# Patient Record
Sex: FEMALE | Race: WHITE | Employment: FULL TIME | URBAN - METROPOLITAN AREA
[De-identification: names, ages, dates, MRNs, and addresses within clinical notes are randomized per-mention and may not be internally consistent; named-entity substitution may affect disease eponyms.]

---

## 2019-03-26 LAB
CHLAMYDIA, EXTERNAL: NEGATIVE
HBSAG, EXTERNAL: NEGATIVE
HIV, EXTERNAL: NON REACTIVE
N. GONORRHEA, EXTERNAL: NEGATIVE
RUBELLA, EXTERNAL: NORMAL
T. PALLIDUM, EXTERNAL: NEGATIVE
TYPE, ABO & RH, EXTERNAL: NORMAL

## 2019-08-12 LAB — ANTIBODY SCREEN, EXTERNAL: NEGATIVE

## 2019-10-07 LAB — GRBS, EXTERNAL: POSITIVE

## 2019-11-07 ENCOUNTER — HOSPITAL ENCOUNTER (INPATIENT)
Age: 29
LOS: 5 days | Discharge: HOME | End: 2019-11-12
Attending: OBSTETRICS & GYNECOLOGY | Admitting: OBSTETRICS & GYNECOLOGY
Payer: COMMERCIAL

## 2019-11-07 DIAGNOSIS — Z3A.40 40 WEEKS GESTATION OF PREGNANCY: Primary | ICD-10-CM

## 2019-11-07 PROBLEM — F41.9 ANXIETY AND DEPRESSION: Chronic | Status: ACTIVE | Noted: 2019-11-07

## 2019-11-07 PROBLEM — F32.A ANXIETY AND DEPRESSION: Chronic | Status: ACTIVE | Noted: 2019-11-07

## 2019-11-07 LAB
ERYTHROCYTE [DISTWIDTH] IN BLOOD BY AUTOMATED COUNT: 14.1 % (ref 11.5–14.5)
HCT VFR BLD AUTO: 38.1 % (ref 35–47)
HGB BLD-MCNC: 12.9 G/DL (ref 11.5–16)
MCH RBC QN AUTO: 29.4 PG (ref 26–34)
MCHC RBC AUTO-ENTMCNC: 33.9 G/DL (ref 30–36.5)
MCV RBC AUTO: 86.8 FL (ref 80–99)
NRBC # BLD: 0 K/UL (ref 0–0.01)
NRBC BLD-RTO: 0 PER 100 WBC
PLATELET # BLD AUTO: 235 K/UL (ref 150–400)
PMV BLD AUTO: 11.1 FL (ref 8.9–12.9)
RBC # BLD AUTO: 4.39 M/UL (ref 3.8–5.2)
WBC # BLD AUTO: 10.9 K/UL (ref 3.6–11)

## 2019-11-07 PROCEDURE — 65270000029 HC RM PRIVATE

## 2019-11-07 PROCEDURE — 3E033VJ INTRODUCTION OF OTHER HORMONE INTO PERIPHERAL VEIN, PERCUTANEOUS APPROACH: ICD-10-PCS | Performed by: INTERNAL MEDICINE

## 2019-11-07 PROCEDURE — 77030028565 HC CATH CERV RIPNG BLN COOK -B

## 2019-11-07 PROCEDURE — 75410000002 HC LABOR FEE PER 1 HR

## 2019-11-07 PROCEDURE — 85027 COMPLETE CBC AUTOMATED: CPT

## 2019-11-07 PROCEDURE — 74011250637 HC RX REV CODE- 250/637: Performed by: MIDWIFE

## 2019-11-07 PROCEDURE — 36415 COLL VENOUS BLD VENIPUNCTURE: CPT

## 2019-11-07 RX ORDER — SODIUM CHLORIDE 0.9 % (FLUSH) 0.9 %
SYRINGE (ML) INJECTION
Status: COMPLETED
Start: 2019-11-07 | End: 2019-11-07

## 2019-11-07 RX ORDER — ZOLPIDEM TARTRATE 5 MG/1
5 TABLET ORAL
Status: DISCONTINUED | OUTPATIENT
Start: 2019-11-07 | End: 2019-11-09 | Stop reason: SDUPTHER

## 2019-11-07 RX ORDER — NALBUPHINE HYDROCHLORIDE 10 MG/ML
10 INJECTION, SOLUTION INTRAMUSCULAR; INTRAVENOUS; SUBCUTANEOUS
Status: DISCONTINUED | OUTPATIENT
Start: 2019-11-07 | End: 2019-11-09 | Stop reason: HOSPADM

## 2019-11-07 RX ORDER — SODIUM CHLORIDE 0.9 % (FLUSH) 0.9 %
5-40 SYRINGE (ML) INJECTION AS NEEDED
Status: DISCONTINUED | OUTPATIENT
Start: 2019-11-07 | End: 2019-11-12 | Stop reason: HOSPADM

## 2019-11-07 RX ORDER — SODIUM CHLORIDE, SODIUM LACTATE, POTASSIUM CHLORIDE, CALCIUM CHLORIDE 600; 310; 30; 20 MG/100ML; MG/100ML; MG/100ML; MG/100ML
125 INJECTION, SOLUTION INTRAVENOUS CONTINUOUS
Status: DISCONTINUED | OUTPATIENT
Start: 2019-11-07 | End: 2019-11-12 | Stop reason: HOSPADM

## 2019-11-07 RX ORDER — SODIUM CHLORIDE 0.9 % (FLUSH) 0.9 %
5-40 SYRINGE (ML) INJECTION EVERY 8 HOURS
Status: DISCONTINUED | OUTPATIENT
Start: 2019-11-07 | End: 2019-11-12 | Stop reason: HOSPADM

## 2019-11-07 RX ORDER — OXYTOCIN/0.9 % SODIUM CHLORIDE 30/500 ML
0-25 PLASTIC BAG, INJECTION (ML) INTRAVENOUS
Status: DISCONTINUED | OUTPATIENT
Start: 2019-11-08 | End: 2019-11-09 | Stop reason: HOSPADM

## 2019-11-07 RX ORDER — ONDANSETRON 2 MG/ML
4 INJECTION INTRAMUSCULAR; INTRAVENOUS
Status: DISCONTINUED | OUTPATIENT
Start: 2019-11-07 | End: 2019-11-09 | Stop reason: HOSPADM

## 2019-11-07 RX ORDER — TERBUTALINE SULFATE 1 MG/ML
0.25 INJECTION SUBCUTANEOUS AS NEEDED
Status: DISCONTINUED | OUTPATIENT
Start: 2019-11-07 | End: 2019-11-09 | Stop reason: HOSPADM

## 2019-11-07 RX ORDER — CALCIUM CARBONATE 200(500)MG
1 TABLET,CHEWABLE ORAL DAILY
COMMUNITY

## 2019-11-07 RX ADMIN — Medication 10 ML: at 20:16

## 2019-11-07 RX ADMIN — Medication 10 ML: at 16:36

## 2019-11-07 RX ADMIN — ZOLPIDEM TARTRATE 5 MG: 5 TABLET ORAL at 20:15

## 2019-11-07 NOTE — PROGRESS NOTES
1A  admitted to LR 3 under the services of Dr. Eddie Thomason, Dr. Isaac Winston covering. Pt admitted for elective induction. Pt denies complications with this pregnancy. 163:  IV started in R hand and saline locked, see MAR. Labs drawn with IV start. :  Dr. Isaac Winston at bedside discussing plan of care with pt.  7430:  Cervical exam per Dr. Isaac Winston:  3.  :  Cervical ripening balloon placed per Dr. Isaac Winston:  50 ml NaCl in uterine balloon and 60 ml NaCl in vaginal balloon. :  Fetal monitors off.  :  Bedside and Verbal shift change report given to JUAN Kaminski RN (oncoming nurse) by HAN Boss RN (offgoing nurse). Report included the following information SBAR, Kardex, Intake/Output, MAR and Recent Results.

## 2019-11-08 ENCOUNTER — ANESTHESIA (OUTPATIENT)
Dept: LABOR AND DELIVERY | Age: 29
End: 2019-11-08
Payer: COMMERCIAL

## 2019-11-08 ENCOUNTER — ANESTHESIA EVENT (OUTPATIENT)
Dept: LABOR AND DELIVERY | Age: 29
End: 2019-11-08
Payer: COMMERCIAL

## 2019-11-08 PROCEDURE — 75410000002 HC LABOR FEE PER 1 HR

## 2019-11-08 PROCEDURE — 74011000250 HC RX REV CODE- 250: Performed by: ANESTHESIOLOGY

## 2019-11-08 PROCEDURE — 74011250636 HC RX REV CODE- 250/636: Performed by: MIDWIFE

## 2019-11-08 PROCEDURE — 74011250636 HC RX REV CODE- 250/636: Performed by: OBSTETRICS & GYNECOLOGY

## 2019-11-08 PROCEDURE — 74011000250 HC RX REV CODE- 250

## 2019-11-08 PROCEDURE — 65270000029 HC RM PRIVATE

## 2019-11-08 PROCEDURE — 74011000258 HC RX REV CODE- 258: Performed by: OBSTETRICS & GYNECOLOGY

## 2019-11-08 PROCEDURE — 77030011943

## 2019-11-08 RX ORDER — BUPIVACAINE HYDROCHLORIDE 2.5 MG/ML
10 INJECTION, SOLUTION EPIDURAL; INFILTRATION; INTRACAUDAL ONCE
Status: DISCONTINUED | OUTPATIENT
Start: 2019-11-08 | End: 2019-11-09 | Stop reason: HOSPADM

## 2019-11-08 RX ORDER — NALOXONE HYDROCHLORIDE 0.4 MG/ML
0.4 INJECTION, SOLUTION INTRAMUSCULAR; INTRAVENOUS; SUBCUTANEOUS AS NEEDED
Status: DISCONTINUED | OUTPATIENT
Start: 2019-11-08 | End: 2019-11-09 | Stop reason: HOSPADM

## 2019-11-08 RX ORDER — BUPIVACAINE HYDROCHLORIDE 2.5 MG/ML
INJECTION, SOLUTION EPIDURAL; INFILTRATION; INTRACAUDAL AS NEEDED
Status: DISCONTINUED | OUTPATIENT
Start: 2019-11-08 | End: 2019-11-09 | Stop reason: HOSPADM

## 2019-11-08 RX ORDER — NALBUPHINE HYDROCHLORIDE 10 MG/ML
2.5 INJECTION, SOLUTION INTRAMUSCULAR; INTRAVENOUS; SUBCUTANEOUS
Status: DISCONTINUED | OUTPATIENT
Start: 2019-11-08 | End: 2019-11-09 | Stop reason: HOSPADM

## 2019-11-08 RX ORDER — FENTANYL/BUPIVACAINE/NS/PF 2-1250MCG
1-16 PREFILLED PUMP RESERVOIR EPIDURAL CONTINUOUS
Status: DISCONTINUED | OUTPATIENT
Start: 2019-11-08 | End: 2019-11-09 | Stop reason: HOSPADM

## 2019-11-08 RX ORDER — EPHEDRINE SULFATE/0.9% NACL/PF 50 MG/5 ML
SYRINGE (ML) INTRAVENOUS
Status: DISPENSED
Start: 2019-11-08 | End: 2019-11-09

## 2019-11-08 RX ORDER — EPHEDRINE SULFATE/0.9% NACL/PF 50 MG/5 ML
10 SYRINGE (ML) INTRAVENOUS
Status: DISCONTINUED | OUTPATIENT
Start: 2019-11-08 | End: 2019-11-09 | Stop reason: HOSPADM

## 2019-11-08 RX ORDER — FENTANYL/BUPIVACAINE/NS/PF 2-1250MCG
PREFILLED PUMP RESERVOIR EPIDURAL
Status: COMPLETED
Start: 2019-11-08 | End: 2019-11-08

## 2019-11-08 RX ORDER — FENTANYL CITRATE 50 UG/ML
100 INJECTION, SOLUTION INTRAMUSCULAR; INTRAVENOUS ONCE
Status: DISCONTINUED | OUTPATIENT
Start: 2019-11-08 | End: 2019-11-09 | Stop reason: HOSPADM

## 2019-11-08 RX ORDER — FENTANYL CITRATE 50 UG/ML
INJECTION, SOLUTION INTRAMUSCULAR; INTRAVENOUS AS NEEDED
Status: DISCONTINUED | OUTPATIENT
Start: 2019-11-08 | End: 2019-11-09 | Stop reason: HOSPADM

## 2019-11-08 RX ORDER — FENTANYL CITRATE 50 UG/ML
INJECTION, SOLUTION INTRAMUSCULAR; INTRAVENOUS
Status: COMPLETED
Start: 2019-11-08 | End: 2019-11-08

## 2019-11-08 RX ORDER — BUPIVACAINE HYDROCHLORIDE 2.5 MG/ML
INJECTION, SOLUTION EPIDURAL; INFILTRATION; INTRACAUDAL
Status: COMPLETED
Start: 2019-11-08 | End: 2019-11-08

## 2019-11-08 RX ADMIN — PENICILLIN G POTASSIUM 2.5 MILLION UNITS: 20000000 POWDER, FOR SOLUTION INTRAVENOUS at 16:00

## 2019-11-08 RX ADMIN — OXYTOCIN-SODIUM CHLORIDE 0.9% IV SOLN 30 UNIT/500ML 2 MILLI-UNITS/MIN: 30-0.9/5 SOLUTION at 04:03

## 2019-11-08 RX ADMIN — Medication 10 ML/HR: at 22:17

## 2019-11-08 RX ADMIN — SODIUM CHLORIDE 5 MILLION UNITS: 900 INJECTION, SOLUTION INTRAVENOUS at 04:04

## 2019-11-08 RX ADMIN — PENICILLIN G POTASSIUM 2.5 MILLION UNITS: 20000000 POWDER, FOR SOLUTION INTRAVENOUS at 20:11

## 2019-11-08 RX ADMIN — Medication 10 ML: at 04:00

## 2019-11-08 RX ADMIN — PENICILLIN G POTASSIUM 2.5 MILLION UNITS: 20000000 POWDER, FOR SOLUTION INTRAVENOUS at 08:23

## 2019-11-08 RX ADMIN — PENICILLIN G POTASSIUM 2.5 MILLION UNITS: 20000000 POWDER, FOR SOLUTION INTRAVENOUS at 12:00

## 2019-11-08 RX ADMIN — SODIUM CHLORIDE, SODIUM LACTATE, POTASSIUM CHLORIDE, AND CALCIUM CHLORIDE 125 ML/HR: 600; 310; 30; 20 INJECTION, SOLUTION INTRAVENOUS at 22:23

## 2019-11-08 RX ADMIN — ONDANSETRON 4 MG: 2 INJECTION INTRAMUSCULAR; INTRAVENOUS at 17:10

## 2019-11-08 RX ADMIN — NALBUPHINE HYDROCHLORIDE 10 MG: 10 INJECTION, SOLUTION INTRAMUSCULAR; INTRAVENOUS; SUBCUTANEOUS at 14:12

## 2019-11-08 RX ADMIN — BUPIVACAINE HYDROCHLORIDE 10 ML: 2.5 INJECTION, SOLUTION EPIDURAL; INFILTRATION; INTRACAUDAL; PERINEURAL at 21:55

## 2019-11-08 RX ADMIN — SODIUM CHLORIDE, SODIUM LACTATE, POTASSIUM CHLORIDE, AND CALCIUM CHLORIDE 125 ML/HR: 600; 310; 30; 20 INJECTION, SOLUTION INTRAVENOUS at 13:11

## 2019-11-08 RX ADMIN — SODIUM CHLORIDE, SODIUM LACTATE, POTASSIUM CHLORIDE, AND CALCIUM CHLORIDE 125 ML/HR: 600; 310; 30; 20 INJECTION, SOLUTION INTRAVENOUS at 04:03

## 2019-11-08 RX ADMIN — FENTANYL CITRATE 100 MCG: 50 INJECTION, SOLUTION INTRAMUSCULAR; INTRAVENOUS at 21:55

## 2019-11-08 NOTE — PROGRESS NOTES
1930 Bedside SBAR report received from [address] reactive tracing- monitor off- plan of care discussed- ambien given- pt instructed to call for ROM, strong regular contractions.  Will begin pitocin at 0400 2330 Bedside SBAR report to [address]

## 2019-11-08 NOTE — PROGRESS NOTES
2330: Bedside and Verbal shift change report given to TRISTIN Koo (oncoming nurse) by Shelly Gautam RN (offgoing nurse). Report included the following information SBAR, Intake/Output, MAR and Recent Results. 0500: Amo balloon removed without difficulty by RN. \    [id number]: Bedside and Verbal shift change report given to VEE Nevarez (oncoming nurse) by [employer]. Ronald Squires RN (offgoing nurse). Report included the following information SBAR, Intake/Output, MAR and Recent Results.

## 2019-11-08 NOTE — PROGRESS NOTES
Bedside shift change report given to VEE Lopez RN (oncoming nurse) by [employer]. Sohan Contreras RN (offgoing nurse). Report included the following information SBAR, Kardex and MAR.     1414 nubain given for pain management. 1635 side lying pelvic release performed on both left and right sides.

## 2019-11-08 NOTE — PROGRESS NOTES
In room to see patient. Feeling some CTX. Visit Vitals  /73   Pulse 66   Temp 98.6 °F (37 °C)   Resp 14   Ht 5' 2\" (1.575 m)   Wt 90.3 kg (199 lb)   Breastfeeding? No   BMI 36.40 kg/m²     SVE: Pt unable to tolerate  EFM: Cat 1  [place]: Q2-3 mins    A/P: Pt is a 35 yo G1 at 40+4 presenting for postdates IOL. Continue pitocin augmentation. Reattempt ROM at 11am.  Epidural PRN. Anticipate .

## 2019-11-08 NOTE — PROGRESS NOTES
In room to see patient. Feeling CTX slightly more strongly. Visit Vitals  /74   Pulse 84   Temp 98.6 °F (37 °C)   Resp 14   Ht 5' 2\" (1.575 m)   Wt 90.3 kg (199 lb)   Breastfeeding? No   BMI 36.40 kg/m²     SVE: /-3, AROM clear fluid  EFM: Cat 1  [place]: CTX q2-3 mins    A/P: Pt is a 33 yo G1 at 40+4 wga presenting for postdates IOL. Continue pitocin augmentation. NO2/Epidural PRN. Anticipate .

## 2019-11-08 NOTE — PROGRESS NOTES
CNM Labor Progress Note     Patient: Isaak Flanagan MRN: [id number]  SSN: [id number]    YOB: 1990  Age: 34 y.o. Sex: female        Subjective:   Patient coping well with intracervical catheter; She is sitting in bed with spouse in room; Discussed POC to have ambien for sleep and start pitocin/PCN @ 0400; No questions at this time. Objective:   Blood pressure 127/82, pulse 82, temperature 99.3 °F (37.4 °C), resp. rate 18, height 5' 2\" (1.575 m), weight 90.3 kg (199 lb), not currently breastfeeding. Patient Vitals for the past 4 hrs:    Mode Fetal Heart Rate Fetal Activity Variability Decelerations Accelerations RN Reviewed Strip?   19 1859 External 135 Present 6-25 BPM None Yes Yes   19 1830 External 135 Present 6-25 BPM None Yes Yes   19 1800 External 145 Present 6-25 BPM None Yes Yes   19 1730 External 140 Present 6-25 BPM None Yes Yes   19 1700 External 145 Present 6-25 BPM None Yes Yes   19 1630 External 135 Present 6-25 BPM None No Yes   19 1617 External 140          Sterile Vaginal Exam: Deferred    Assessment:    SIUP @ 40w3d by Estimated Date of Delivery: 19  Labor induction, post dates    Plan:   Continue current orders/management    -ambien for sleep   -start pitocin and PCN (GBS prophylaxis) at 0400   -remove intracervical balloon post 12 hours after placement  Pain management, as needed  CNM management   Anticipate     Collaborative MD: Michel    Signed By:  Maria Alejandra Reed CNM      2019 7:56 PM

## 2019-11-09 PROCEDURE — 75410000002 HC LABOR FEE PER 1 HR

## 2019-11-09 PROCEDURE — 74011250636 HC RX REV CODE- 250/636: Performed by: MIDWIFE

## 2019-11-09 PROCEDURE — 74011250636 HC RX REV CODE- 250/636: Performed by: ANESTHESIOLOGY

## 2019-11-09 PROCEDURE — 77030012890

## 2019-11-09 PROCEDURE — 76060000078 HC EPIDURAL ANESTHESIA: Performed by: OBSTETRICS & GYNECOLOGY

## 2019-11-09 PROCEDURE — 76010000391 HC C SECN FIRST 1 HR: Performed by: OBSTETRICS & GYNECOLOGY

## 2019-11-09 PROCEDURE — 74011250636 HC RX REV CODE- 250/636: Performed by: OBSTETRICS & GYNECOLOGY

## 2019-11-09 PROCEDURE — 74011000250 HC RX REV CODE- 250: Performed by: ANESTHESIOLOGY

## 2019-11-09 PROCEDURE — 76010000392 HC C SECN EA ADDL 0.5 HR: Performed by: OBSTETRICS & GYNECOLOGY

## 2019-11-09 PROCEDURE — 10907ZC DRAINAGE OF AMNIOTIC FLUID, THERAPEUTIC FROM PRODUCTS OF CONCEPTION, VIA NATURAL OR ARTIFICIAL OPENING: ICD-10-PCS | Performed by: OBSTETRICS & GYNECOLOGY

## 2019-11-09 PROCEDURE — 77030040830 HC CATH URETH FOL MDII -A

## 2019-11-09 PROCEDURE — 65410000002 HC RM PRIVATE OB

## 2019-11-09 PROCEDURE — 75410000003 HC RECOV DEL/VAG/CSECN EA 0.5 HR: Performed by: OBSTETRICS & GYNECOLOGY

## 2019-11-09 PROCEDURE — 74011250636 HC RX REV CODE- 250/636: Performed by: NURSE ANESTHETIST, CERTIFIED REGISTERED

## 2019-11-09 PROCEDURE — 74011250636 HC RX REV CODE- 250/636

## 2019-11-09 RX ORDER — MORPHINE SULFATE 10 MG/ML
6 INJECTION, SOLUTION INTRAMUSCULAR; INTRAVENOUS
Status: DISCONTINUED | OUTPATIENT
Start: 2019-11-09 | End: 2019-11-12 | Stop reason: HOSPADM

## 2019-11-09 RX ORDER — SIMETHICONE 80 MG
80 TABLET,CHEWABLE ORAL
Status: DISCONTINUED | OUTPATIENT
Start: 2019-11-09 | End: 2019-11-12 | Stop reason: HOSPADM

## 2019-11-09 RX ORDER — SODIUM CHLORIDE 0.9 % (FLUSH) 0.9 %
5-40 SYRINGE (ML) INJECTION AS NEEDED
Status: DISCONTINUED | OUTPATIENT
Start: 2019-11-09 | End: 2019-11-12 | Stop reason: HOSPADM

## 2019-11-09 RX ORDER — NALOXONE HYDROCHLORIDE 0.4 MG/ML
0.4 INJECTION, SOLUTION INTRAMUSCULAR; INTRAVENOUS; SUBCUTANEOUS AS NEEDED
Status: DISCONTINUED | OUTPATIENT
Start: 2019-11-09 | End: 2019-11-12 | Stop reason: HOSPADM

## 2019-11-09 RX ORDER — LIDOCAINE HYDROCHLORIDE AND EPINEPHRINE 20; 5 MG/ML; UG/ML
INJECTION, SOLUTION EPIDURAL; INFILTRATION; INTRACAUDAL; PERINEURAL
Status: COMPLETED
Start: 2019-11-09 | End: 2019-11-09

## 2019-11-09 RX ORDER — IBUPROFEN 400 MG/1
800 TABLET ORAL EVERY 8 HOURS
Status: DISCONTINUED | OUTPATIENT
Start: 2019-11-09 | End: 2019-11-12 | Stop reason: HOSPADM

## 2019-11-09 RX ORDER — SODIUM CHLORIDE, SODIUM LACTATE, POTASSIUM CHLORIDE, CALCIUM CHLORIDE 600; 310; 30; 20 MG/100ML; MG/100ML; MG/100ML; MG/100ML
125 INJECTION, SOLUTION INTRAVENOUS CONTINUOUS
Status: DISCONTINUED | OUTPATIENT
Start: 2019-11-09 | End: 2019-11-12 | Stop reason: HOSPADM

## 2019-11-09 RX ORDER — LIDOCAINE HYDROCHLORIDE AND EPINEPHRINE 20; 5 MG/ML; UG/ML
INJECTION, SOLUTION EPIDURAL; INFILTRATION; INTRACAUDAL; PERINEURAL AS NEEDED
Status: DISCONTINUED | OUTPATIENT
Start: 2019-11-09 | End: 2019-11-09 | Stop reason: HOSPADM

## 2019-11-09 RX ORDER — OXYCODONE AND ACETAMINOPHEN 5; 325 MG/1; MG/1
2 TABLET ORAL
Status: DISCONTINUED | OUTPATIENT
Start: 2019-11-09 | End: 2019-11-12 | Stop reason: HOSPADM

## 2019-11-09 RX ORDER — KETOROLAC TROMETHAMINE 30 MG/ML
30 INJECTION, SOLUTION INTRAMUSCULAR; INTRAVENOUS
Status: DISPENSED | OUTPATIENT
Start: 2019-11-09 | End: 2019-11-10

## 2019-11-09 RX ORDER — SODIUM CHLORIDE 0.9 % (FLUSH) 0.9 %
5-40 SYRINGE (ML) INJECTION EVERY 8 HOURS
Status: DISCONTINUED | OUTPATIENT
Start: 2019-11-09 | End: 2019-11-12 | Stop reason: HOSPADM

## 2019-11-09 RX ORDER — OXYTOCIN 10 [USP'U]/ML
INJECTION, SOLUTION INTRAMUSCULAR; INTRAVENOUS
Status: COMPLETED
Start: 2019-11-09 | End: 2019-11-09

## 2019-11-09 RX ORDER — ACETAMINOPHEN 325 MG/1
650 TABLET ORAL
Status: DISCONTINUED | OUTPATIENT
Start: 2019-11-09 | End: 2019-11-12 | Stop reason: HOSPADM

## 2019-11-09 RX ORDER — OXYTOCIN/RINGER'S LACTATE 20/1000 ML
125 PLASTIC BAG, INJECTION (ML) INTRAVENOUS AS NEEDED
Status: DISCONTINUED | OUTPATIENT
Start: 2019-11-09 | End: 2019-11-12 | Stop reason: HOSPADM

## 2019-11-09 RX ORDER — SODIUM CHLORIDE, SODIUM LACTATE, POTASSIUM CHLORIDE, CALCIUM CHLORIDE 600; 310; 30; 20 MG/100ML; MG/100ML; MG/100ML; MG/100ML
INJECTION, SOLUTION INTRAVENOUS
Status: DISCONTINUED | OUTPATIENT
Start: 2019-11-09 | End: 2019-11-09 | Stop reason: HOSPADM

## 2019-11-09 RX ORDER — MORPHINE SULFATE 0.5 MG/ML
INJECTION, SOLUTION EPIDURAL; INTRATHECAL; INTRAVENOUS AS NEEDED
Status: DISCONTINUED | OUTPATIENT
Start: 2019-11-09 | End: 2019-11-09 | Stop reason: HOSPADM

## 2019-11-09 RX ORDER — ONDANSETRON 2 MG/ML
4 INJECTION INTRAMUSCULAR; INTRAVENOUS
Status: DISCONTINUED | OUTPATIENT
Start: 2019-11-09 | End: 2019-11-12 | Stop reason: HOSPADM

## 2019-11-09 RX ORDER — AMMONIA 15 % (W/V)
1 AMPUL (EA) INHALATION AS NEEDED
Status: DISCONTINUED | OUTPATIENT
Start: 2019-11-09 | End: 2019-11-12 | Stop reason: HOSPADM

## 2019-11-09 RX ORDER — HYDROCORTISONE 1 %
CREAM (GRAM) TOPICAL AS NEEDED
Status: DISCONTINUED | OUTPATIENT
Start: 2019-11-09 | End: 2019-11-12 | Stop reason: HOSPADM

## 2019-11-09 RX ORDER — DIPHENHYDRAMINE HCL 25 MG
50 CAPSULE ORAL
Status: DISCONTINUED | OUTPATIENT
Start: 2019-11-09 | End: 2019-11-12 | Stop reason: HOSPADM

## 2019-11-09 RX ORDER — DEXAMETHASONE SODIUM PHOSPHATE 4 MG/ML
INJECTION, SOLUTION INTRA-ARTICULAR; INTRALESIONAL; INTRAMUSCULAR; INTRAVENOUS; SOFT TISSUE AS NEEDED
Status: DISCONTINUED | OUTPATIENT
Start: 2019-11-09 | End: 2019-11-09

## 2019-11-09 RX ORDER — ONDANSETRON 2 MG/ML
INJECTION INTRAMUSCULAR; INTRAVENOUS AS NEEDED
Status: DISCONTINUED | OUTPATIENT
Start: 2019-11-09 | End: 2019-11-09

## 2019-11-09 RX ORDER — NALBUPHINE HYDROCHLORIDE 10 MG/ML
2.5 INJECTION, SOLUTION INTRAMUSCULAR; INTRAVENOUS; SUBCUTANEOUS
Status: DISCONTINUED | OUTPATIENT
Start: 2019-11-09 | End: 2019-11-12 | Stop reason: HOSPADM

## 2019-11-09 RX ORDER — PHENYLEPHRINE HCL IN 0.9% NACL 0.4MG/10ML
SYRINGE (ML) INTRAVENOUS AS NEEDED
Status: DISCONTINUED | OUTPATIENT
Start: 2019-11-09 | End: 2019-11-09 | Stop reason: HOSPADM

## 2019-11-09 RX ORDER — OXYTOCIN 10 [USP'U]/ML
10 INJECTION, SOLUTION INTRAMUSCULAR; INTRAVENOUS ONCE
Status: COMPLETED | OUTPATIENT
Start: 2019-11-09 | End: 2019-11-09

## 2019-11-09 RX ORDER — OXYTOCIN/RINGER'S LACTATE 20/1000 ML
999 PLASTIC BAG, INJECTION (ML) INTRAVENOUS AS NEEDED
Status: DISCONTINUED | OUTPATIENT
Start: 2019-11-09 | End: 2019-11-12 | Stop reason: HOSPADM

## 2019-11-09 RX ORDER — ONDANSETRON 4 MG/1
4 TABLET, ORALLY DISINTEGRATING ORAL
Status: DISCONTINUED | OUTPATIENT
Start: 2019-11-09 | End: 2019-11-12 | Stop reason: HOSPADM

## 2019-11-09 RX ORDER — CEFAZOLIN SODIUM/WATER 2 G/20 ML
2 SYRINGE (ML) INTRAVENOUS ONCE
Status: COMPLETED | OUTPATIENT
Start: 2019-11-09 | End: 2019-11-09

## 2019-11-09 RX ORDER — OXYCODONE AND ACETAMINOPHEN 5; 325 MG/1; MG/1
1 TABLET ORAL
Status: DISCONTINUED | OUTPATIENT
Start: 2019-11-09 | End: 2019-11-12 | Stop reason: HOSPADM

## 2019-11-09 RX ORDER — ZOLPIDEM TARTRATE 5 MG/1
5 TABLET ORAL
Status: DISCONTINUED | OUTPATIENT
Start: 2019-11-09 | End: 2019-11-12 | Stop reason: HOSPADM

## 2019-11-09 RX ORDER — DOCUSATE SODIUM 100 MG/1
100 CAPSULE, LIQUID FILLED ORAL
Status: DISCONTINUED | OUTPATIENT
Start: 2019-11-09 | End: 2019-11-12 | Stop reason: HOSPADM

## 2019-11-09 RX ORDER — MORPHINE SULFATE 10 MG/ML
10 INJECTION, SOLUTION INTRAMUSCULAR; INTRAVENOUS
Status: DISCONTINUED | OUTPATIENT
Start: 2019-11-09 | End: 2019-11-12 | Stop reason: HOSPADM

## 2019-11-09 RX ORDER — OXYTOCIN/RINGER'S LACTATE 20/1000 ML
PLASTIC BAG, INJECTION (ML) INTRAVENOUS
Status: COMPLETED
Start: 2019-11-09 | End: 2019-11-09

## 2019-11-09 RX ORDER — OXYTOCIN/RINGER'S LACTATE 20/1000 ML
PLASTIC BAG, INJECTION (ML) INTRAVENOUS AS NEEDED
Status: DISCONTINUED | OUTPATIENT
Start: 2019-11-09 | End: 2019-11-09 | Stop reason: HOSPADM

## 2019-11-09 RX ADMIN — TERBUTALINE SULFATE 0.25 MG: 1 INJECTION SUBCUTANEOUS at 03:34

## 2019-11-09 RX ADMIN — Medication 100 MCG: at 04:23

## 2019-11-09 RX ADMIN — LIDOCAINE HYDROCHLORIDE AND EPINEPHRINE 5 ML: 20; 5 INJECTION, SOLUTION EPIDURAL; INFILTRATION; INTRACAUDAL; PERINEURAL at 04:16

## 2019-11-09 RX ADMIN — MEPERIDINE HYDROCHLORIDE 25 MG: 50 INJECTION INTRAMUSCULAR; INTRAVENOUS; SUBCUTANEOUS at 04:26

## 2019-11-09 RX ADMIN — OXYTOCIN 10 UNITS: 10 INJECTION, SOLUTION INTRAMUSCULAR; INTRAVENOUS at 05:52

## 2019-11-09 RX ADMIN — SODIUM CHLORIDE, POTASSIUM CHLORIDE, SODIUM LACTATE AND CALCIUM CHLORIDE: 600; 310; 30; 20 INJECTION, SOLUTION INTRAVENOUS at 03:52

## 2019-11-09 RX ADMIN — DEXAMETHASONE SODIUM PHOSPHATE 4 MG: 4 INJECTION, SOLUTION INTRAMUSCULAR; INTRAVENOUS at 04:31

## 2019-11-09 RX ADMIN — SODIUM CHLORIDE, SODIUM LACTATE, POTASSIUM CHLORIDE, AND CALCIUM CHLORIDE 125 ML/HR: 600; 310; 30; 20 INJECTION, SOLUTION INTRAVENOUS at 04:08

## 2019-11-09 RX ADMIN — KETOROLAC TROMETHAMINE 30 MG: 30 INJECTION, SOLUTION INTRAMUSCULAR at 13:03

## 2019-11-09 RX ADMIN — KETOROLAC TROMETHAMINE 30 MG: 30 INJECTION, SOLUTION INTRAMUSCULAR at 19:59

## 2019-11-09 RX ADMIN — FENTANYL CITRATE 100 MCG: 50 INJECTION, SOLUTION INTRAMUSCULAR; INTRAVENOUS at 04:26

## 2019-11-09 RX ADMIN — Medication 200 MCG: at 04:28

## 2019-11-09 RX ADMIN — AZITHROMYCIN MONOHYDRATE 500 MG: 500 INJECTION, POWDER, LYOPHILIZED, FOR SOLUTION INTRAVENOUS at 04:21

## 2019-11-09 RX ADMIN — PENICILLIN G POTASSIUM 2.5 MILLION UNITS: 20000000 POWDER, FOR SOLUTION INTRAVENOUS at 02:25

## 2019-11-09 RX ADMIN — Medication 100 MCG: at 04:14

## 2019-11-09 RX ADMIN — OXYTOCIN-SODIUM CHLORIDE 0.9% IV SOLN 30 UNIT/500ML 15 MILLI-UNITS/MIN: 30-0.9/5 SOLUTION at 03:09

## 2019-11-09 RX ADMIN — Medication 2 G: at 04:00

## 2019-11-09 RX ADMIN — Medication 100 MCG: at 04:34

## 2019-11-09 RX ADMIN — Medication 100 MCG: at 04:50

## 2019-11-09 RX ADMIN — MORPHINE SULFATE 3 MG: 0.5 INJECTION, SOLUTION EPIDURAL; INTRATHECAL; INTRAVENOUS at 04:54

## 2019-11-09 RX ADMIN — LIDOCAINE HYDROCHLORIDE AND EPINEPHRINE 5 ML: 20; 5 INJECTION, SOLUTION EPIDURAL; INFILTRATION; INTRACAUDAL; PERINEURAL at 04:10

## 2019-11-09 RX ADMIN — Medication 100 MCG: at 04:46

## 2019-11-09 RX ADMIN — Medication 100 MCG: at 04:40

## 2019-11-09 RX ADMIN — ONDANSETRON HYDROCHLORIDE 4 MG: 2 INJECTION, SOLUTION INTRAMUSCULAR; INTRAVENOUS at 04:29

## 2019-11-09 RX ADMIN — Medication 1000 ML: at 04:39

## 2019-11-09 NOTE — PROGRESS NOTES
CNM Labor Progress Note     Patient: Miracle Alcantara MRN: [id number]  SSN: [id number]    YOB: 1990  Age: 34 y.o. Sex: female        Subjective:   Patient would like an epidural. Reports increased pain and being tired. Requests cervical exam.      Objective:   Blood pressure 122/70, pulse 79, temperature 97.5 °F (36.4 °C), resp. rate 18, height 5' 2\" (1.575 m), weight 90.3 kg (199 lb), not currently breastfeeding. Fetal heart baseline 120, moderate variability, positive accelerations,  negative decelerations, Uterine contractions q 2-5 minutes, moderate to palpation, resting tone soft, Sterile Vaginal Exam: 5 cm dilated/ 70 % effaced/ -2 station, cervix  midline, fetal presentation vertex, membranes ruptured for continued clear fluid. Pitocin stopped    IUPC placed without difficulty, patient and fetus tolerated well. Patient Vitals for the past 4 hrs:   Temp Pulse Resp BP   19 1941 97.5 °F (36.4 °C) 79 18 122/70       Assessment:     40w4d  Category 1 fetal heart rate tracing   IOL for postdates- slow progress    Plan:   IUPC placed to confirm adequacy. Pitocin break for 2 hours, then re titrate to adequate pattern. Epidural for pain control. Recheck cervix 4 hours after adequate pattern achieved.   Anticipate DELFINA Crespo

## 2019-11-09 NOTE — ANESTHESIA PROCEDURE NOTES
Epidural Block    Start time: 11/8/2019 9:45 PM  End time: 11/8/2019 9:45 PM  Performed by: Zoila Perry MD  Authorized by: Zoila Perry MD     Pre-Procedure  Indication: labor epidural    Preanesthetic Checklist: patient identified, risks and benefits discussed, anesthesia consent, site marked, patient being monitored, timeout performed and anesthesia consent    Timeout Time: 21:45        Epidural:   Patient position:  Seated  Prep region:  Lumbar  Prep: Patient draped and Chlorhexidine    Location:  L3-4    Needle and Epidural Catheter:   Needle Type:  Tuohy  Needle Gauge:  17 G  Injection Technique:  Loss of resistance using saline  Attempts:  1  Catheter Size:  18 G  Catheter at Skin Depth (cm):  13  Depth in Epidural Space (cm):  8  Events: no blood with aspiration, no cerebrospinal fluid with aspiration, no paresthesia and negative aspiration test    Test Dose:  Negative    Assessment:   Catheter Secured:  Tegaderm and tape  Insertion:  Uncomplicated  Patient tolerance:  Patient tolerated the procedure well with no immediate complications

## 2019-11-09 NOTE — PROGRESS NOTES
1935: Bedside shift change report given to DANNIE Amos RN (oncoming nurse) by VEE Ford, DOT (offgoing nurse). Report included the following information SBAR, Intake/Output, MAR and Recent Results. 2002: VEE Meyers at bedside discussing plan of care. 2106: VEE Khalil at bedside. SVE unchanged. 2109: IUPC placed     2112: Pt requesting epidural. Fluid bolus started    2201: Dr. Anna Carson at bedside    9087: C/S called by Dr. Dominick Choudhury: Bolus started    0630: transfer of care and report given to TRISTIN Wilkerson RN

## 2019-11-09 NOTE — LACTATION NOTE
This note was copied from a baby's chart. Initial Lactation Consultation - Baby born by  this morning to a  mom at 36 5/7 weeks gestation. Mom said the baby has been sleepy and not interested in breast feeding. She has been able to hand express and give drops of colostrum. I helped mom with a feeding this afternoon. Baby is spitting a small amount of clear fluid. I was able to get him to burp several times. He would not suck on my finger and when we put him to the breast he did not suck at the breast. I helped mom with hand expression. We were able to express 10 drops of colostrum from each breast to give to the baby. Feeding Plan: Mother will keep baby skin to skin as often as possible, feed on demand, respond to feeding cues, obtain latch, listen for audible swallowing, be aware of signs of oxytocin release/ cramping, thirst and sleepiness while breastfeeding. Mom will not limit the time the baby is at the breast. She will allow him to completely finish one breast and then offer the second breast at each feeding. She will call out as needed for assistance with latching. 1500 - Baby continues to be sleepy. I was able to get him to burp several time. He is not rooting or otherwise acting hungry. He will not suck on my finger. I put baby on mom in the prone position. I could get moms nipple into baby's mouth but he would not suck. Mom was able to hand express 20 drops of colostrum that I spoon fed to the baby. 1700 - Deep suctioned for a mod amount of clear fluid. Baby then showing some feeding cues. Back with mom we got baby to latch. He sucked a few times but then fell asleep on the breast. We attempted to get her latched on both breasts. Mom will continue to offer the breast every 2-3 hours. She will hand express and give drops of colostrum if baby doesn't latch.

## 2019-11-09 NOTE — ANESTHESIA POSTPROCEDURE EVALUATION
Post-Anesthesia Evaluation and Assessment    Patient: Abhijeet Leon MRN: [id number]  SSN: [id number]    YOB: 1990  Age: 34 y.o. Sex: female      I have evaluated the patient and they are stable and ready for discharge from the PACU. Cardiovascular Function/Vital Signs  Visit Vitals  BP 99/66   Pulse 99   Temp 36.8 °C (98.3 °F)   Resp 14   Ht 5' 2\" (1.575 m)   Wt 90.3 kg (199 lb)   SpO2 99%   Breastfeeding? No   BMI 36.40 kg/m²       Patient is status post Epidural anesthesia for Procedure(s):   SECTION. Nausea/Vomiting: None    Postoperative hydration reviewed and adequate. Pain:  Pain Scale 1: Labor Algorithm/Pain Intensity (19 0129)   Managed    Neurological Status:   Neuro (WDL): Within Defined Limits (19 0406)   At baseline    Mental Status, Level of Consciousness: Alert and  oriented to person, place, and time    Pulmonary Status:   O2 Device: Room air (19 0800)   Adequate oxygenation and airway patent    Complications related to anesthesia: None    Post-anesthesia assessment completed.  No concerns    Signed By: Saeed Villalta MD     2019

## 2019-11-09 NOTE — PROGRESS NOTES
OB Hospitalist    Called to the bedside to review strip and labor progress. Late decels noted now resolved after intrauterine resuscitation measures- Pitocin turned off, left lateral positioning, oxygen inhahalation. Cervix unchanged after adequate contractions.  section offered.  R/B/A discussed bleeding, infection, risk of injury to bladder bowel, extension of incision, injury to , DVT, PE.    B positive  Iv Ancef and Zithromax    Michelle Haas MD

## 2019-11-09 NOTE — ROUTINE PROCESS
TRANSFER - IN REPORT: 
 
Verbal report received from Bentley Jhaveri RN(name) on Kerline Poot  being received from L & D(unit) for routine progression of care Report consisted of patients Situation, Background, Assessment and  
Recommendations(SBAR). Information from the following report(s) SBAR was reviewed with the receiving nurse. Opportunity for questions and clarification was provided. Assessment completed upon patients arrival to unit and care assumed.

## 2019-11-09 NOTE — ROUTINE PROCESS
0944~  Report and transfer of care from [facility] 
07~  Bedside and Verbal shift change report given to [address] (oncoming nurse) by Debbie Guevara RN (offgoing nurse). Report included the following information SBAR, Intake/Output, MAR and Recent Results.

## 2019-11-09 NOTE — PROGRESS NOTES
[facility] Labor Progress Note     Patient: Elizabeth Stephen MRN: [id number]  SSN: [id number]    YOB: 1990  Age: 34 y.o. Sex: female      Care assumed    Subjective:   Patient coping well with contractions. Is using the nitrous oxide to help cope with contractions. Desires an epidural later on for pain control. Reports contractions are starting to feel more uncomfortable. Objective:   Blood pressure 122/70, pulse 79, temperature 97.5 °F (36.4 °C), resp. rate 18, height 5' 2\" (1.575 m), weight 90.3 kg (199 lb), not currently breastfeeding. Fetal heart baseline 125, moderate variability, positive accelerations, negative decelerations, Uterine contractions q 2-3 minutes, moderate to palpation, resting tone soft, Sterile Vaginal Exam: deferred, fetal presentation vertex, membranes ruptured for continued clear fluid. Pitocin at 20 mu/min    Patient Vitals for the past 4 hrs:   Temp Pulse Resp BP   19 1941 97.5 °F (36.4 °C) 79 18 122/70   19 1713 98.4 °F (36.9 °C)      19 1712  81  129/77       Assessment:     40w4d  Category 1 fetal heart rate tracing   IOL for postdates pregnancy    Plan:   Continue current orders/management   Recheck cervix 2100 (4 hours since last check), if no cervical change noted place IUPC.   Anticipate     Aniya Perdue

## 2019-11-09 NOTE — OP NOTES
Operative Note    Name: [facility] Record Number: [id number]   YOB: 1990  Today's Date: 2019      Pre-operative Diagnosis: Failure to Progress    Post-operative Diagnosis: Arrest of Dilatation  CPD  Single live born male infant    Operation: Low Cervical Transverse Procedure(s):   SECTION    Surgeon(s):  MD Ericka Saunders RN - Assist    Anesthesia: Epidural    Prophylactic Antibiotics:  Ancef and Zithromax  DVT Prophylaxis: Sequential Compression Devices         Fetal Description: alfred     Birth Information:   Information for the patient's :  James Chavis [[id number]]   Delivery of a   male infant on 2019 at 4:38 AM. Apgars were 8  and 9 . Umbilical Cord: 3 Vessels     Umbilical Cord Events: Nuchal Cord Without Compressions     Placenta: Expressed removal with Normal appearance. Amniotic Fluid Volume:        Amniotic Fluid Description:  Clear      EBL: 3984 cc    Umbilical Cord: Nuchal Cord x  2    Placenta:  expressed    Specimens: none           Complications:  none    Procedure Detail:      After proper patient identification and consent, the patient was taken to the operating room, where epidural anesthesia was administered and found to be adequate. Mao catheter had been placed using sterile technique. The patient was prepped and draped in the normal sterile fashion. The abdomen was entered using the Pfannenstiel technique. The peritoneum was entered sharply well superior to the bladder without any apparent injury. The bladder flap was created without difficulty. A low transverse uterine incision was made with the scalpel and extended with blunt finger dissection. Amniotomy was performed and the fluid was small amount clear. The babys head was then delivered atraumatically. The nose and mouth were suctioned. The cord was clamped and cut and the baby was handed off to Nursing staff in attendance.  Placenta was then removed from the uterus. The uterus was curettaged with a moist lap pad and cleared of all clots and debris. The uterine incision was closed with 0 monocryl, double layer  in running locking fashion with good hemostasis assured followed by an embricating stitch. The posterior cul-de-sac was irrigated with warm normal saline. Good hemostasis was again reassured and the uterus was returned to the abdomen. The anterior pelvis was irrigated with warm normal saline and good hemostasis was again reassured throughout. The rectus muscles were checked for hemostasis. The fascia was closed with 0 Vicryl in a running fashion. Good hemostasis was assured. The skin was closed with a 4-O Nevaeh  closure. The patient tolerated the procedure well. Sponge, lap, and needle counts were correct times three and the patient and baby were taken to recovery/postpartum room in stable condition.     Zhao Harrell MD  November 9, 2019  5:14 AM

## 2019-11-09 NOTE — ANESTHESIA PREPROCEDURE EVALUATION
Anesthetic History   No history of anesthetic complications            Review of Systems / Medical History  Patient summary reviewed, nursing notes reviewed and pertinent labs reviewed    Pulmonary  Within defined limits                 Neuro/Psych         Psychiatric history     Cardiovascular  Within defined limits                Exercise tolerance: >4 METS     GI/Hepatic/Renal  Within defined limits              Endo/Other  Within defined limits           Other Findings              Physical Exam    Airway  Mallampati: II  TM Distance: 4 - 6 cm  Neck ROM: normal range of motion   Mouth opening: Normal     Cardiovascular    Rhythm: regular  Rate: normal         Dental  No notable dental hx       Pulmonary  Breath sounds clear to auscultation               Abdominal  Abdominal exam normal       Other Findings            Anesthetic Plan    ASA: 2  Anesthesia type: epidural            Anesthetic plan and risks discussed with: Patient

## 2019-11-09 NOTE — PROGRESS NOTES
0730: Bedside and Verbal shift change report given to A. Antonina Lesches RN (oncoming nurse) by Fatmata Klein RN (offgoing nurse). Report included the following information SBAR, Intake/Output and MAR.     0820: TRANSFER - OUT REPORT:    Verbal report given to WES Ghotra RN(name) on Silith.IO Hannah  being transferred to [facility]U(unit) for routine progression of care       Report consisted of patients Situation, Background, Assessment and   Recommendations(SBAR). Information from the following report(s) SBAR, Intake/Output and MAR was reviewed with the receiving nurse. Lines:   Peripheral IV 11/07/19 Right Hand (Active)   Site Assessment Clean, dry, & intact 11/9/2019  7:25 AM   Phlebitis Assessment 0 11/9/2019  7:25 AM   Infiltration Assessment 0 11/9/2019  7:25 AM   Dressing Status Clean, dry, & intact 11/9/2019  7:25 AM   Dressing Type Tape;Transparent 11/9/2019  7:25 AM   Hub Color/Line Status Pink 11/9/2019  7:25 AM        Opportunity for questions and clarification was provided.       Patient transported with:   Registered Nurse

## 2019-11-10 LAB
BASOPHILS # BLD: 0 K/UL (ref 0–0.1)
BASOPHILS NFR BLD: 0 % (ref 0–1)
DIFFERENTIAL METHOD BLD: ABNORMAL
EOSINOPHIL # BLD: 0 K/UL (ref 0–0.4)
EOSINOPHIL NFR BLD: 0 % (ref 0–7)
ERYTHROCYTE [DISTWIDTH] IN BLOOD BY AUTOMATED COUNT: 14.1 % (ref 11.5–14.5)
HCT VFR BLD AUTO: 30.2 % (ref 35–47)
HGB BLD-MCNC: 9.9 G/DL (ref 11.5–16)
IMM GRANULOCYTES # BLD AUTO: 0 K/UL (ref 0–0.04)
IMM GRANULOCYTES NFR BLD AUTO: 0 % (ref 0–0.5)
LYMPHOCYTES # BLD: 2.4 K/UL (ref 0.8–3.5)
LYMPHOCYTES NFR BLD: 21 % (ref 12–49)
MCH RBC QN AUTO: 29.6 PG (ref 26–34)
MCHC RBC AUTO-ENTMCNC: 32.8 G/DL (ref 30–36.5)
MCV RBC AUTO: 90.1 FL (ref 80–99)
MONOCYTES # BLD: 0.8 K/UL (ref 0–1)
MONOCYTES NFR BLD: 7 % (ref 5–13)
NEUTS SEG # BLD: 8.2 K/UL (ref 1.8–8)
NEUTS SEG NFR BLD: 72 % (ref 32–75)
NRBC # BLD: 0 K/UL (ref 0–0.01)
NRBC BLD-RTO: 0 PER 100 WBC
PLATELET # BLD AUTO: 164 K/UL (ref 150–400)
PMV BLD AUTO: 10.9 FL (ref 8.9–12.9)
RBC # BLD AUTO: 3.35 M/UL (ref 3.8–5.2)
WBC # BLD AUTO: 11.5 K/UL (ref 3.6–11)

## 2019-11-10 PROCEDURE — 65410000002 HC RM PRIVATE OB

## 2019-11-10 PROCEDURE — 74011250637 HC RX REV CODE- 250/637: Performed by: OBSTETRICS & GYNECOLOGY

## 2019-11-10 PROCEDURE — 85025 COMPLETE CBC W/AUTO DIFF WBC: CPT

## 2019-11-10 PROCEDURE — 74011250636 HC RX REV CODE- 250/636: Performed by: ANESTHESIOLOGY

## 2019-11-10 PROCEDURE — 36415 COLL VENOUS BLD VENIPUNCTURE: CPT

## 2019-11-10 RX ADMIN — OXYCODONE HYDROCHLORIDE AND ACETAMINOPHEN 2 TABLET: 5; 325 TABLET ORAL at 18:28

## 2019-11-10 RX ADMIN — IBUPROFEN 800 MG: 400 TABLET ORAL at 15:46

## 2019-11-10 RX ADMIN — SIMETHICONE 80 MG: 80 TABLET, CHEWABLE ORAL at 19:30

## 2019-11-10 RX ADMIN — IBUPROFEN 800 MG: 400 TABLET ORAL at 23:23

## 2019-11-10 RX ADMIN — KETOROLAC TROMETHAMINE 30 MG: 30 INJECTION, SOLUTION INTRAMUSCULAR at 01:06

## 2019-11-10 RX ADMIN — DOCUSATE SODIUM 100 MG: 100 CAPSULE, LIQUID FILLED ORAL at 14:11

## 2019-11-10 RX ADMIN — OXYCODONE HYDROCHLORIDE AND ACETAMINOPHEN 1 TABLET: 5; 325 TABLET ORAL at 14:11

## 2019-11-10 RX ADMIN — OXYCODONE HYDROCHLORIDE AND ACETAMINOPHEN 1 TABLET: 5; 325 TABLET ORAL at 10:00

## 2019-11-10 RX ADMIN — OXYCODONE HYDROCHLORIDE AND ACETAMINOPHEN 2 TABLET: 5; 325 TABLET ORAL at 23:23

## 2019-11-10 RX ADMIN — IBUPROFEN 800 MG: 400 TABLET ORAL at 07:37

## 2019-11-10 NOTE — ROUTINE PROCESS
0800: Bedside and Verbal shift change report given to Dany Morocho RN  (oncoming nurse) by CHIKIS Molina RN (offgoing nurse). Report included the following information SBAR.

## 2019-11-10 NOTE — PROGRESS NOTES
Post-Operative  Day 1    Beti Scanlon     Assessment: Post-Op day 1, stable    Plan:   1. Routine post-operative care   2. Planning bris. 3. Anticipate discharge POD#3    Information for the patient's :  Zahira hassan [[id number]]   , Low Transverse   Patient doing well without significant complaint. Nausea and vomiting resolved, tolerating liquids, no flatus, tan in place. Vitals:  Visit Vitals  /80 (BP 1 Location: Left arm, BP Patient Position: At rest)   Pulse 77   Temp 98.3 °F (36.8 °C)   Resp 18   Ht 5' 2\" (1.575 m)   Wt 90.3 kg (199 lb)   SpO2 97%   Breastfeeding? Unknown   BMI 36.40 kg/m²     Temp (24hrs), Av.3 °F (36.8 °C), Min:98.2 °F (36.8 °C), Max:98.4 °F (36.9 °C)      Last 24hr Input/Output:    Intake/Output Summary (Last 24 hours) at 11/10/2019 1023  Last data filed at 2019 1300  Gross per 24 hour   Intake    Output 600 ml   Net -600 ml          Exam:        Patient without distress. Lungs clear. Abdomen, bowel sounds present, soft, expected tenderness, fundus firm Wound dressing intact     Perineum normal lochia noted               Lower extremities are negative for swelling, cords or tenderness.     Labs:   Lab Results   Component Value Date/Time    WBC 11.5 (H) 11/10/2019 01:06 AM    WBC 10.9 2019 04:58 PM    HGB 9.9 (L) 11/10/2019 01:06 AM    HGB 12.9 2019 04:58 PM    HCT 30.2 (L) 11/10/2019 01:06 AM    HCT 38.1 2019 04:58 PM    PLATELET 137  01:06 AM    PLATELET 028  04:58 PM       Recent Results (from the past 24 hour(s))   CBC WITH AUTOMATED DIFF    Collection Time: 11/10/19  1:06 AM   Result Value Ref Range    WBC 11.5 (H) 3.6 - 11.0 K/uL    RBC 3.35 (L) 3.80 - 5.20 M/uL    HGB 9.9 (L) 11.5 - 16.0 g/dL    HCT 30.2 (L) 35.0 - 47.0 %    MCV 90.1 80.0 - 99.0 FL    MCH 29.6 26.0 - 34.0 PG    MCHC 32.8 30.0 - 36.5 g/dL    RDW 14.1 11.5 - 14.5 %    PLATELET 541 256 - 184 K/uL    MPV 10.9 8.9 - 12.9 FL NRBC 0.0 0  WBC    ABSOLUTE NRBC 0.00 0.00 - 0.01 K/uL    NEUTROPHILS 72 32 - 75 %    LYMPHOCYTES 21 12 - 49 %    MONOCYTES 7 5 - 13 %    EOSINOPHILS 0 0 - 7 %    BASOPHILS 0 0 - 1 %    IMMATURE GRANULOCYTES 0 0.0 - 0.5 %    ABS. NEUTROPHILS 8.2 (H) 1.8 - 8.0 K/UL    ABS. LYMPHOCYTES 2.4 0.8 - 3.5 K/UL    ABS. MONOCYTES 0.8 0.0 - 1.0 K/UL    ABS. EOSINOPHILS 0.0 0.0 - 0.4 K/UL    ABS. BASOPHILS 0.0 0.0 - 0.1 K/UL    ABS. IMM.  GRANS. 0.0 0.00 - 0.04 K/UL    DF AUTOMATED

## 2019-11-10 NOTE — LACTATION NOTE
This note was copied from a baby's chart. Mom states the baby has been latching and nursing well through out the night. At the last feeding he nursed for 40 minutes. His bilirubin this morning was 7.1 in the high intermediate zone. I talked to mom about starting pumping to increase the breast stimulation and milk production. Baby breast fed at 730 this morning and mom plans to feed again by 1030. I will set the pump up at that time and show mom how to use it. 65 - Mom is able to get the latched on her own. Baby appears to have a deep latch. He is sucking rhythmically with the occasional swallow heard. Mom will allow baby to completely finish one breast and then offer the second breast at each feeding. Baby's bilirubin level was 7.1 which is high intermediate. He has a follow up bilirubin scheduled for 7 pm this evening. I set mom up with the electric breast pump. I encouraged her to pump after nursing and then to hand express for a few minutes after pumping. She will give the baby any breast milk she is able to collect.

## 2019-11-11 PROCEDURE — 65410000002 HC RM PRIVATE OB

## 2019-11-11 PROCEDURE — 74011250637 HC RX REV CODE- 250/637: Performed by: OBSTETRICS & GYNECOLOGY

## 2019-11-11 RX ORDER — OXYCODONE AND ACETAMINOPHEN 5; 325 MG/1; MG/1
2 TABLET ORAL
Qty: 30 TAB | Refills: 0 | Status: SHIPPED | OUTPATIENT
Start: 2019-11-11 | End: 2019-11-14

## 2019-11-11 RX ORDER — IBUPROFEN 800 MG/1
800 TABLET ORAL
Qty: 30 TAB | Refills: 0 | Status: SHIPPED | OUTPATIENT
Start: 2019-11-11

## 2019-11-11 RX ADMIN — SIMETHICONE 80 MG: 80 TABLET, CHEWABLE ORAL at 17:43

## 2019-11-11 RX ADMIN — OXYCODONE HYDROCHLORIDE AND ACETAMINOPHEN 2 TABLET: 5; 325 TABLET ORAL at 14:32

## 2019-11-11 RX ADMIN — OXYCODONE HYDROCHLORIDE AND ACETAMINOPHEN 2 TABLET: 5; 325 TABLET ORAL at 09:02

## 2019-11-11 RX ADMIN — OXYCODONE HYDROCHLORIDE AND ACETAMINOPHEN 2 TABLET: 5; 325 TABLET ORAL at 04:21

## 2019-11-11 RX ADMIN — IBUPROFEN 800 MG: 400 TABLET ORAL at 17:43

## 2019-11-11 RX ADMIN — OXYCODONE HYDROCHLORIDE AND ACETAMINOPHEN 2 TABLET: 5; 325 TABLET ORAL at 19:14

## 2019-11-11 RX ADMIN — IBUPROFEN 800 MG: 400 TABLET ORAL at 09:02

## 2019-11-11 RX ADMIN — DOCUSATE SODIUM 100 MG: 100 CAPSULE, LIQUID FILLED ORAL at 17:43

## 2019-11-11 NOTE — ROUTINE PROCESS
0730:Bedside and Verbal shift change report given to DANNIE De Oliveira (oncoming nurse) by Thaddeus Kenny (offgoing nurse). Report included the following information SBAR.

## 2019-11-11 NOTE — PROGRESS NOTES
Post-Operative  Day 2    Beti Scanlon     Assessment: Post-Op day 2, doing well    Plan:   1. Routine post-operative care    Information for the patient's :  Patricia hassan [[id number]]   , Low Transverse   Patient doing well without significant complaint. Nausea and vomiting resolved, tolerating PO, passing flatus, voiding and ambulating without difficulty. Vitals:  Visit Vitals  /78 (BP 1 Location: Right arm, BP Patient Position: Sitting)   Pulse 82   Temp 97.8 °F (36.6 °C)   Resp 16   Ht 5' 2\" (1.575 m)   Wt 90.3 kg (199 lb)   SpO2 97%   Breastfeeding? Unknown   BMI 36.40 kg/m²     Temp (24hrs), Av.1 °F (36.7 °C), Min:97.8 °F (36.6 °C), Max:98.6 °F (37 °C)        Exam:        Patient without distress. Abdomen, soft, expected tenderness, fundus firm                Wound dressing clean, dry and intact               Lower extremities are symmetric without tenderness, cords or erythema. Labs:   Lab Results   Component Value Date/Time    WBC 11.5 (H) 11/10/2019 01:06 AM    WBC 10.9 2019 04:58 PM    HGB 9.9 (L) 11/10/2019 01:06 AM    HGB 12.9 2019 04:58 PM    HCT 30.2 (L) 11/10/2019 01:06 AM    HCT 38.1 2019 04:58 PM    PLATELET 492  01:06 AM    PLATELET 556  04:58 PM       No results found for this or any previous visit (from the past 24 hour(s)).

## 2019-11-11 NOTE — ROUTINE PROCESS
Bedside shift change report given to DANNIE Kenny RN (oncoming nurse) by Emilie Bonilla RN (offgoing nurse).  Report included the following information SBAR, Kardex, Procedure Summary, Intake/Output, MAR and Recent Results.

## 2019-11-11 NOTE — DISCHARGE SUMMARY
Obstetrical Discharge Summary     Name: Bijal Martinez MRN: [id number]  SSN: [id number]    YOB: 1990  Age: 34 y.o. Sex: female      Admit Date: 2019    Discharge Date: 19    Admitting Physician: Ashley Umana MD     Attending Physician:  Jennifer Rosas MD     Admission Diagnoses: 40 weeks gestation of pregnancy [Z3A.40]    Discharge Diagnoses:   Information for the patient's :  Avi Aguilar [[id number]]   Delivery of a 3.61 kg male infant via , Low Transverse on 2019 at 4:38 AM  by Nate Souza. Apgars were 8  and 9 . Additional Diagnoses:   Hospital Problems  Date Reviewed: 2019          Codes Class Noted POA    40 weeks gestation of pregnancy ICD-10-CM: Z3A.40  ICD-9-CM: V22.2  2019 Unknown        Anxiety and depression (Chronic) ICD-10-CM: F41.9, F32.9  ICD-9-CM: 300.00, 311  2019 Yes    Overview Signed 2019 10:21 PM by Mariama Mccarty CNM     D/c meds when she learned of pregnancy                  Lab Results   Component Value Date/Time    Rubella, External Immune 2019    GrBStrep, External positive 10/07/2019       Hospital Course: Normal hospital course following the delivery. Disposition at Discharge: Home or self care    Discharged Condition: Stable    Patient Instructions:   Current Discharge Medication List      START taking these medications    Details   ibuprofen (MOTRIN) 800 mg tablet Take 1 Tab by mouth every eight (8) hours as needed for Pain. Qty: 30 Tab, Refills: 0      oxyCODONE-acetaminophen (PERCOCET) 5-325 mg per tablet Take 2 Tabs by mouth every four (4) hours as needed for Pain for up to 3 days. Max Daily Amount: 12 Tabs. Qty: 30 Tab, Refills: 0    Associated Diagnoses: 40 weeks gestation of pregnancy         CONTINUE these medications which have NOT CHANGED    Details   [id number] alana-folic-dha (PRENATAL DHA+COMPLETE PRENATAL) -300 mg-mcg-mg cmpk Take  by mouth.  Indications: pregnancy calcium carbonate (TUMS) 200 mg calcium (500 mg) chew Take 1 Tab by mouth daily. Indications: heartburn             Reference my discharge instructions.     Follow-up Appointments   Procedures    FOLLOW UP VISIT Appointment in: 6 Weeks     Standing Status:   Standing     Number of Occurrences:   1     Order Specific Question:   Appointment in     Answer:   6 Weeks        Signed By:  Anita Diaz MD     November 11, 2019

## 2019-11-12 VITALS
SYSTOLIC BLOOD PRESSURE: 126 MMHG | DIASTOLIC BLOOD PRESSURE: 77 MMHG | RESPIRATION RATE: 16 BRPM | TEMPERATURE: 98.2 F | WEIGHT: 199 LBS | HEIGHT: 62 IN | OXYGEN SATURATION: 97 % | BODY MASS INDEX: 36.62 KG/M2 | HEART RATE: 67 BPM

## 2019-11-12 PROCEDURE — 74011250637 HC RX REV CODE- 250/637: Performed by: OBSTETRICS & GYNECOLOGY

## 2019-11-12 RX ADMIN — DOCUSATE SODIUM 100 MG: 100 CAPSULE, LIQUID FILLED ORAL at 16:54

## 2019-11-12 RX ADMIN — IBUPROFEN 800 MG: 400 TABLET ORAL at 03:10

## 2019-11-12 RX ADMIN — OXYCODONE HYDROCHLORIDE AND ACETAMINOPHEN 2 TABLET: 5; 325 TABLET ORAL at 01:13

## 2019-11-12 RX ADMIN — OXYCODONE HYDROCHLORIDE AND ACETAMINOPHEN 2 TABLET: 5; 325 TABLET ORAL at 16:54

## 2019-11-12 RX ADMIN — OXYCODONE HYDROCHLORIDE AND ACETAMINOPHEN 2 TABLET: 5; 325 TABLET ORAL at 07:45

## 2019-11-12 RX ADMIN — IBUPROFEN 800 MG: 400 TABLET ORAL at 11:33

## 2019-11-12 NOTE — ROUTINE PROCESS
0730:Bedside and Verbal shift change report given to DANNIE Vasquez (oncoming nurse) by VEE Escobedo (offgoing nurse). Report included the following information SBAR.  
 
2980: I have reviewed discharge instructions with the patient. The patient verbalized understanding. All questions answered. Pt taken to d/c lot by RN in wheelchair.

## 2019-11-12 NOTE — PROGRESS NOTES
Post-Operative  Day 3    Beti Scanlon       Assessment: Post-Op day 3, doing well    Plan:   1. Discharge home today  2. Follow up in office in 6 weeks with Sukhjinder Armstrong MD  3. Post partum activity/wound care advised, diet as tolerated  4. Discharge Medications: ibuprofen, percocet and medications prior to admission      Information for the patient's :  Leeanne hassan [[id number]]   , Low Transverse   Patient doing well without significant complaint. Tolerating diet, passing flatus, voiding and ambulating without difficulty. Breastfeeding well. Vitals:  Visit Vitals  /86 (BP 1 Location: Left arm, BP Patient Position: At rest)   Pulse 82   Temp 97.9 °F (36.6 °C)   Resp 16   Ht 5' 2\" (1.575 m)   Wt 90.3 kg (199 lb)   SpO2 97%   Breastfeeding? Unknown   BMI 36.40 kg/m²     Temp (24hrs), Av.1 °F (36.7 °C), Min:97.9 °F (36.6 °C), Max:98.3 °F (36.8 °C)        Exam:        Patient without distress. Abdomen soft, expected tenderness, fundus firm                Wound incision clean, dry and intact               Lower extremities are negative for swelling, cords or tenderness. Labs:   Lab Results   Component Value Date/Time    WBC 11.5 (H) 11/10/2019 01:06 AM    WBC 10.9 2019 04:58 PM    HGB 9.9 (L) 11/10/2019 01:06 AM    HGB 12.9 2019 04:58 PM    HCT 30.2 (L) 11/10/2019 01:06 AM    HCT 38.1 2019 04:58 PM    PLATELET 055 146 01:06 AM    PLATELET 484  04:58 PM       No results found for this or any previous visit (from the past 24 hour(s)).

## 2019-11-12 NOTE — ROUTINE PROCESS
Bedside shift change report given to [facility] (oncoming nurse) by Phil Murray RN (offgoing nurse). Report included the following information SBAR, Kardex, Intake/Output and MAR.

## 2019-11-12 NOTE — DISCHARGE INSTRUCTIONS
**Remove incision dressing after one week, sooner if preferred.  Section: What to Expect at Home    Your Recovery:  A  section, or , is surgery to deliver your baby through a cut, called an incision that the doctor makes in your lower belly and uterus. You may have some pain in your lower belly and need pain medicine for 1 to 2 weeks. You can expect some vaginal bleeding for several weeks. You will probably need about 6 weeks to fully recover. It is important to take it easy while the incision is healing. Avoid heavy lifting, strenuous activities, or exercises that strain the belly muscles while you are recovering. Ask a family member or friend for help with housework, cooking, and shopping. This care sheet gives you a general idea about how long it will take for you to recover. But each person recovers at a different pace. Follow the steps below to get better as quickly as possible. How can you care for yourself at home? Activity  · Rest when you feel tired. Getting enough sleep will help you recover. · Try to walk each day. Start by walking a little more than you did the day before. Bit by bit, increase the amount you walk. Walking boosts blood flow and helps prevent pneumonia, constipation, and blood clots. · Avoid strenuous activities, such as bicycle riding, jogging, weightlifting, and aerobic exercise, for 6 weeks or until your doctor says it is okay. · Until your doctor says it is okay, do not lift anything heavier than your baby. · Do not do sit-ups or other exercise that strain the belly muscles for 6 weeks or until your doctor says it is okay. · Hold a pillow over your incision when you cough or take deep breaths. This will support your belly and decrease your pain. · You may shower as usual. Pat the incision dry when you are done. · You will have some vaginal bleeding. Wear sanitary pads. Do not douche or use tampons until your doctor says it is okay.   · Ask your doctor when you can drive again. · You will probably need to take at least 6 weeks off work. It depends on the type of work you do and how you feel. · Wait until you are healed (about 4 to 6 weeks) before you have sexual intercourse. Your doctor will tell you when it is okay to have sex. · Talk to your doctor about birth control. You can get pregnant even before your period returns. Also, you can get pregnant while you are breast-feeding. Incision care  Your skin is your body's first line of defense against germs, but an incision site leaves an easy way for germs to enter your body. To prevent infection:  · Clean your hands frequently and before and after changing any touching any dressings. · If you have strips of tape on the incision, leave the tape on for a week or until it falls off. · Look at your incision closely every day for any changes. Contact your doctor if you experience any signs of infection, such as fever or increased redness at the surgical site. · Wash the area daily with warm, soapy water, and pat it dry. Don't use hydrogen peroxide or alcohol, which can slow healing. You may cover the area with a gauze bandage if it weeps or rubs against clothing. Change the bandage every day. · Keep the area clean and dry. Diet  · You can eat your normal diet. If your stomach is upset, try bland, low-fat foods like plain rice, broiled chicken, toast, and yogurt. · Drink plenty of fluids (unless your doctor tells you not to). · You may notice that your bowel movements are not regular right after your surgery. This is common. Try to avoid constipation and straining with bowel movements. You may want to take a fiber supplement every day. If you have not had a bowel movement after a couple of days, ask your doctor about taking a mild laxative. · If you are breast-feeding, do not drink any alcohol. Medicines  · Take pain medicines exactly as directed.   · If the doctor gave you a prescription medicine for pain, take it as prescribed. · If you are not taking a prescription pain medicine, ask your doctor if you can take an over-the-counter medicine such as acetaminophen (Tylenol), ibuprofen (Advil, Motrin), or naproxen (Aleve), for cramps. Read and follow all instructions on the label. Do not take aspirin, because it can cause more bleeding. Do not take acetaminophen (Tylenol) and other acetaminophen containing medications (i.e. Percocet) at the same time. · If you think your pain medicine is making you sick to your stomach:  · Take your medicine after meals (unless your doctor has told you not to). · Ask your doctor for a different pain medicine. · If your doctor prescribed antibiotics, take them as directed. Do not stop taking them just because you feel better. You need to take the full course of antibiotics. Mental Health  · Many women get the \"baby blues\" during the first few days after childbirth. You may lose sleep, feel irritable, and cry easily. You may feel happy one minute and sad the next. Hormone changes are one cause of these emotional changes. Also, the demands of a new baby, along with visits from relatives or other family needs, add to a mother's stress. The \"baby blues\" often peak around the fourth day. Then they ease up in less than 2 weeks. · If your moodiness or anxiety lasts for more than 2 weeks, or if you feel like life is not worth living, you may have postpartum depression. This is different for each mother. Some mothers with serious depression may worry intensely about their infant's well-being. Others may feel distant from their child. Some mothers might even feel that they might harm their baby. A mother may have signs of paranoia, wondering if someone is watching her. · With all the changes in your life, you may not know if you are depressed. Pregnancy sometimes causes changes in how you feel that are similar to the symptoms of depression.   · Symptoms of depression include:  · Feeling sad or hopeless and losing interest in daily activities. These are the most common symptoms of depression. · Sleeping too much or not enough. · Feeling tired. You may feel as if you have no energy. · Eating too much or too little. · [employer] ([employer]) offers a Warm line; Chat with the Expert phone sessions; Information and Articles about Pregnancy and Postpartum Mood Disorders; Comprehensive List of Free Support Groups; Knowledgeable local coordinators who will offer support, information, and resources; Guide to Resources on [employer]; Calendar of events in the  mood disorders community; Latest News and Research; and [facility] & [facility] [address] for [employer]. Remember - You are not alone; You are not to blame; With help, you will be well. 5-282-027-PPD(4473). WWW. POSTPART[employer]. NET   · Writing or talking about death, such as writing suicide notes or talking about guns, knives, or pills. Keep the numbers for these national suicide hotlines: 4-628-243-TALK (7-382.245.4477) and 2-296-JRKWWJF (2-497.698.4712). If you or someone you know talks about suicide or feeling hopeless, get help right away. Other instructions  · If you breast-feed your baby, you may be more comfortable while you are healing if you place the baby so that he or she is not resting on your belly. Try tucking your baby under your arm, with his or her body along the side you will be feeding on. Support your baby's upper body with your arm. With that hand you can control your baby's head to bring his or her mouth to your breast. This is sometimes called the football hold. Follow-up care is a key part of your treatment and safety. Be sure to make and go to all appointments, and call your doctor if you are having problems. It's also a good idea to know your test results and keep a list of the medicines you take. When should you call for help?     Call 911 anytime you think you may need emergency care. For example, call if:  · You are thinking of hurting yourself, your baby, or anyone else. · You passed out (lost consciousness). · You have symptoms of a blood clot in your lung (called a pulmonary embolism). These may include:  · Sudden chest pain. · Trouble breathing. · Coughing up blood. Call your doctor now or seek immediate medical care if:    · You have severe vaginal bleeding. · You are soaking through a pad each hour for 2 or more hours. · Your vaginal bleeding seems to be getting heavier or is still bright red 4 days after delivery. · You are dizzy or lightheaded, or you feel like you may faint. · You are vomiting or cannot keep fluids down. · You have a fever. · You have new or more belly pain. · You have loose stitches, or your incision comes open. · You have symptoms of infection, such as:  · Increased pain, swelling, warmth, or redness. · Red streaks leading from the incision. · Pus draining from the incision. · A fever  · You pass tissue (not just blood). · Your vaginal discharge smells bad. · Your belly feels tender or full and hard. · Your breasts are continuously painful or red. · You feel sad, anxious, or hopeless for more than a few days. · You have sudden, severe pain in your belly. · You have symptoms of a blood clot in your leg (called a deep vein thrombosis), such as:  · Pain in your calf, back of the knee, thigh, or groin. · Redness and swelling in your leg or groin. · You have symptoms of preeclampsia, such as:  · Sudden swelling of your face, hands, or feet. · New vision problems (such as dimness or blurring). · A severe headache. · Your blood pressure is higher than it should be or rises suddenly. · You have new nausea or vomiting. Watch closely for changes in your health, and be sure to contact your doctor if you have any problems.     Depression After Childbirth: Care Instructions  Your Care Instructions    Many women get the \"baby blues\" during the first few days after childbirth. You may lose sleep, feel irritable, and cry easily. You may feel happy one minute and sad the next. Hormone changes are one cause of these emotional changes. Also, the demands of a new baby, along with visits from relatives or other family needs, add to a mother's stress. The \"baby blues\" often peak around the fourth day. Then they ease up in less than 2 weeks. If your moodiness or anxiety lasts for more than 2 weeks, or if you feel like life is not worth living, you may have postpartum depression. This is different for each mother. Some mothers with serious depression may worry intensely about their infant's well-being. Others may feel distant from their child. Some mothers might even feel that they might harm their baby. A mother may have signs of paranoia, wondering if someone is watching her. Depression is not a sign of weakness. It is a medical condition that requires treatment. Medicine and counseling often work well to reduce depression. Talk to your doctor about taking antidepressant medicine while breastfeeding. Follow-up care is a key part of your treatment and safety. Be sure to make and go to all appointments, and call your doctor if you are having problems. It's also a good idea to know your test results and keep a list of the medicines you take. How do you know if you are depressed? With all the changes in your life, you may not know if you are depressed. Pregnancy sometimes causes changes in how you feel that are similar to the symptoms of depression. Symptoms of depression include:  · Feeling sad or hopeless and losing interest in daily activities. These are the most common symptoms of depression. · Sleeping too much or not enough. · Feeling tired. You may feel as if you have no energy. · Eating too much or too little. · Writing or talking about death, such as writing suicide notes or talking about guns, knives, or pills.  Keep the numbers for these national suicide hotlines: 6-784-222-TALK (7-880.722.2303) and 3-918-JVMXJEY (7-328.867.7670). If you or someone you know talks about suicide or feeling hopeless, get help right away. How can you care for yourself at home? · Be safe with medicines. Take your medicines exactly as prescribed. Call your doctor if you think you are having a problem with your medicine. · Eat a healthy diet so that you can keep up your energy. · Get regular daily exercise, such as walks, to help improve your mood. · Get as much sunlight as possible. Keep your shades and curtains open. Get outside as much as you can. · Avoid using alcohol or other substances to feel better. · Get as much rest and sleep as possible. Avoid doing too much. Being too tired can increase depression. · Play stimulating music throughout your day and soothing music at night. · Schedule outings and visits with friends and family. Ask them to call you regularly, so that you do not feel alone. · Ask for help with preparing food and other daily tasks. Family and friends are often happy to help a mother with a . · Be honest with yourself and those who care about you. Tell them about your struggle. · Join a support group of new mothers. No one can better understand the challenges of caring for a  than other new mothers. · If you feel like life is not worth living or are feeling hopeless, get help right away. Keep the numbers for these national suicide hotlines: -TALK (7-222.937.5720) and 1-263-OHIIEUS (7-308.603.3703). When should you call for help? Call 911 anytime you think you may need emergency care.  For example, call if:    · You feel you cannot stop from hurting yourself, your baby, or someone else.   [facility] your doctor now or seek immediate medical care if:    · You are having trouble caring for yourself or your baby.     · You hear voices.   Geoff Bergs closely for changes in your health, and be sure to contact your doctor if:    · You have problems with your depression medicine.     · You do not get better as expected. Where can you learn more? Go to http://liz-karin.info/. Enter Y205 in the search box to learn more about \"Depression After Childbirth: Care Instructions. \"  Current as of: May 28, 2019  Content Version: 12.2  © 3169-4177 [employer], [employer]. Care instructions adapted under license by [employer] (which disclaims liability or warranty for this information).  If you have questions about a medical condition or this instruction, always ask your healthcare professional. [place] [address] any warranty or liability for your use of this information.

## 2019-11-12 NOTE — LACTATION NOTE
This note was copied from a baby's chart. Infant nursing well, moms milk is coming in. Weight loss 9.1%. Infant has a bili in the high intermediate zone. Mom is feeding infant frequently. At the time of my visit, infant latched deeply, rhythmic sucking and audible swallowing noted. Mom encouraged to manually express following feeding sessions to facilitate bilirubin excretion. Mom has lots of easily expressed colostrum/EBM. Infant stools are transitioning. Mom has a follow up appt tomorrow with pediatrician. If needed, she will pc with 10 ml of formula via syringe. Instructions provided for syringe feeding. Mom has a breast pump at home and will pump following feeding attempts if infant fails to nurse well. Breasts may become engorged when milk \"comes in\". How milk is made / normal phases of milk production, supply and demand discussed. Taught care of engorged breasts - frequent breastfeeding encouraged, warm compresses and breast massage ac. Then nurse the baby or pump. Apply cold compresses pc x 15 minutes a few times a day for swelling or discomfort. May need to do this care for a couple of days. Discussed prevention and treatment of mastitis.

## 2019-11-12 NOTE — ROUTINE PROCESS
Bedside shift change report given to VEE Escobedo RN (oncoming nurse) by TATIANA Tang RN (offgoing nurse). Report included the following information SBAR, Kardex, Intake/Output, MAR and Recent Results.

## 2022-01-01 NOTE — H&P
History & Physical    Name: Derick Zarate MRN: [id number]  SSN: [id number]    YOB: 1990  Age: 34 y.o. Sex: female      Subjective:     Estimated Date of Delivery: None noted. OB History    Para Term  AB Living   1             SAB TAB Ectopic Molar Multiple Live Births                    # Outcome Date GA Lbr Indio/2nd Weight Sex Delivery Anes PTL Lv   1 Current                Ms. Zara Locke is admitted with pregnancy at 36 3/7 wks for elective induction of labor at term. Prenatal course was normal.  Please see prenatal records for details. No past medical history on file. No past surgical history on file. Social History     Occupational History    Not on file   Tobacco Use    Smoking status: Not on file   Substance and Sexual Activity    Alcohol use: Not on file    Drug use: Not on file    Sexual activity: Not on file     No family history on file. No Known Allergies  Prior to Admission medications    Not on File        Review of Systems: A comprehensive review of systems was negative except for that written in the History of Present Illness. Objective:     Vitals: There were no vitals filed for this visit. Physical Exam:  Patient without distress. Lung: normal respiratory effort  Abdomen: soft, nontender  Fundus: soft and non tender  Perineum: blood absent, amniotic fluid absent  Cervical Exam: /-3, posterior.  Cephalic by leopold's   EFW 8 lbs  Lower Extremities:  - Edema No  Membranes:  Intact  Fetal Heart Rate: Baseline: 140 per minute  Variability: moderate  Accelerations: yes  Decelerations: none  Uterine contractions: irregular        Prenatal Labs:   No results found for: ABORH, RUBELLAEXT, HBSAGEXT, HIVEXT, RPREXT, GONNOEXT, CHLAMEXT, ABORHEXT, RUBELLAEXT, GRBSEXT, HBSAGEXT, HIVEXT, RPREXT, GONNOEXT, CHLAMEXT, ABORHEXT, RUBELLAEXT, GRBSEXT, HBSAGEXT, HIVEXT, RPREXT, GONNOEXT, CHLAMEXT     B+  GBS+    Impression/Plan:     Active Problems:    40 weeks gestation of pregnancy (2019)         Plan: Admit for induction of labor. 1. Group B Streptococcus carrier  -PCN with pitocin tomorrow am    2. abnormal glucose tolerance test  -Abnormal 1hr --> 3 hr WNL    3. gestation period, 40 weeks  -Admit to L&D.  -Moa bulb for cervical ripening.  -Pitocin at 4 AM.  -GBS pos -- start abx with pitocin . -EFM/Buna.  -AROM PRN. -Epidural PRN. -Anticipate . Cook catheter placed without difficulty at 66 91 21 and inflated 50 cc/60cc. Statement Selected

## 2022-03-19 PROBLEM — F41.9 ANXIETY AND DEPRESSION: Status: ACTIVE | Noted: 2019-11-07

## 2022-03-19 PROBLEM — Z3A.40 40 WEEKS GESTATION OF PREGNANCY: Status: ACTIVE | Noted: 2019-11-07

## 2022-03-19 PROBLEM — F32.A ANXIETY AND DEPRESSION: Status: ACTIVE | Noted: 2019-11-07

## 2023-06-16 ENCOUNTER — HOSPITAL ENCOUNTER (EMERGENCY)
Facility: HOSPITAL | Age: 33
Discharge: HOME | End: 2023-06-16
Attending: EMERGENCY MEDICINE
Payer: COMMERCIAL

## 2023-06-16 ENCOUNTER — APPOINTMENT (OUTPATIENT)
Facility: HOSPITAL | Age: 33
End: 2023-06-16
Payer: COMMERCIAL

## 2023-06-16 VITALS
HEART RATE: 87 BPM | TEMPERATURE: 99.2 F | RESPIRATION RATE: 18 BRPM | DIASTOLIC BLOOD PRESSURE: 66 MMHG | SYSTOLIC BLOOD PRESSURE: 102 MMHG | OXYGEN SATURATION: 99 %

## 2023-06-16 DIAGNOSIS — K52.9 ENTERITIS: ICD-10-CM

## 2023-06-16 DIAGNOSIS — R10.30 LOWER ABDOMINAL PAIN: Primary | ICD-10-CM

## 2023-06-16 LAB
ACCESSION NUMBER, LLC1M: ABNORMAL
ACINETOBACTER CALCOAC BAUMANNII COMPLEX BY PCR: NOT DETECTED
ALBUMIN SERPL-MCNC: 4 G/DL (ref 3.5–5)
ALBUMIN/GLOB SERPL: 0.9 (ref 1.1–2.2)
ALP SERPL-CCNC: 59 U/L (ref 45–117)
ALT SERPL-CCNC: 13 U/L (ref 12–78)
ANION GAP SERPL CALC-SCNC: 10 MMOL/L (ref 5–15)
APPEARANCE UR: CLEAR
AST SERPL-CCNC: 7 U/L (ref 15–37)
B FRAGILIS DNA BLD POS QL NAA+NON-PROBE: NOT DETECTED
BACTERIA URNS QL MICRO: ABNORMAL /HPF
BASOPHILS # BLD: 0 K/UL (ref 0–0.1)
BASOPHILS NFR BLD: 0 % (ref 0–1)
BILIRUB SERPL-MCNC: 1.1 MG/DL (ref 0.2–1)
BILIRUB UR QL: NEGATIVE
BIOFIRE TEST COMMENT: ABNORMAL
BUN SERPL-MCNC: 11 MG/DL (ref 6–20)
BUN/CREAT SERPL: 13 (ref 12–20)
C ALBICANS DNA BLD POS QL NAA+NON-PROBE: NOT DETECTED
C AURIS DNA BLD POS QL NAA+NON-PROBE: NOT DETECTED
C GATTII+NEOFOR DNA BLD POS QL NAA+N-PRB: NOT DETECTED
C GLABRATA DNA BLD POS QL NAA+NON-PROBE: NOT DETECTED
C KRUSEI DNA BLD POS QL NAA+NON-PROBE: NOT DETECTED
C PARAP DNA BLD POS QL NAA+NON-PROBE: NOT DETECTED
C TRACH DNA SPEC QL NAA+PROBE: NEGATIVE
C TROPICLS DNA BLD POS QL NAA+NON-PROBE: NOT DETECTED
CALCIUM SERPL-MCNC: 9.5 MG/DL (ref 8.5–10.1)
CHLORIDE SERPL-SCNC: 104 MMOL/L (ref 97–108)
CO2 SERPL-SCNC: 24 MMOL/L (ref 21–32)
COLOR UR: ABNORMAL
COMMENT:: NORMAL
COMMENT:: NORMAL
CREAT SERPL-MCNC: 0.83 MG/DL (ref 0.55–1.02)
DIFFERENTIAL METHOD BLD: ABNORMAL
E CLOAC COMP DNA BLD POS NAA+NON-PROBE: NOT DETECTED
E COLI DNA BLD POS QL NAA+NON-PROBE: NOT DETECTED
E FAECALIS DNA BLD POS QL NAA+NON-PROBE: NOT DETECTED
E FAECIUM DNA BLD POS QL NAA+NON-PROBE: NOT DETECTED
ENTEROBACTERALES DNA BLD POS NAA+N-PRB: NOT DETECTED
EOSINOPHIL # BLD: 0 K/UL (ref 0–0.4)
EOSINOPHIL NFR BLD: 0 % (ref 0–7)
EPITH CASTS URNS QL MICRO: ABNORMAL /LPF
ERYTHROCYTE [DISTWIDTH] IN BLOOD BY AUTOMATED COUNT: 11.9 % (ref 11.5–14.5)
GLOBULIN SER CALC-MCNC: 4.4 G/DL (ref 2–4)
GLUCOSE SERPL-MCNC: 147 MG/DL (ref 65–100)
GLUCOSE UR STRIP.AUTO-MCNC: NEGATIVE MG/DL
GP B STREP DNA BLD POS QL NAA+NON-PROBE: NOT DETECTED
HAEM INFLU DNA BLD POS QL NAA+NON-PROBE: NOT DETECTED
HCG SERPL QL: NEGATIVE
HCT VFR BLD AUTO: 43.4 % (ref 35–47)
HGB BLD-MCNC: 13.9 G/DL (ref 11.5–16)
HGB UR QL STRIP: NEGATIVE
IMM GRANULOCYTES # BLD AUTO: 0 K/UL (ref 0–0.04)
IMM GRANULOCYTES NFR BLD AUTO: 0 % (ref 0–0.5)
K OXYTOCA DNA BLD POS QL NAA+NON-PROBE: NOT DETECTED
KETONES UR QL STRIP.AUTO: 40 MG/DL
KLEBSIELLA SP DNA BLD POS QL NAA+NON-PRB: NOT DETECTED
KLEBSIELLA SP DNA BLD POS QL NAA+NON-PRB: NOT DETECTED
L MONOCYTOG DNA BLD POS QL NAA+NON-PROBE: NOT DETECTED
LACTATE SERPL-SCNC: 1.6 MMOL/L (ref 0.4–2)
LEUKOCYTE ESTERASE UR QL STRIP.AUTO: NEGATIVE
LIPASE SERPL-CCNC: 102 U/L (ref 73–393)
LYMPHOCYTES # BLD: 0.7 K/UL (ref 0.8–3.5)
LYMPHOCYTES NFR BLD: 6 % (ref 12–49)
MCH RBC QN AUTO: 28.5 PG (ref 26–34)
MCHC RBC AUTO-ENTMCNC: 32 G/DL (ref 30–36.5)
MCV RBC AUTO: 88.9 FL (ref 80–99)
MONOCYTES # BLD: 0.3 K/UL (ref 0–1)
MONOCYTES NFR BLD: 3 % (ref 5–13)
MUCOUS THREADS URNS QL MICRO: ABNORMAL /LPF
N GONORRHOEA DNA SPEC QL NAA+PROBE: NEGATIVE
N MEN DNA BLD POS QL NAA+NON-PROBE: NOT DETECTED
NEUTS SEG # BLD: 9.9 K/UL (ref 1.8–8)
NEUTS SEG NFR BLD: 91 % (ref 32–75)
NITRITE UR QL STRIP.AUTO: NEGATIVE
NRBC # BLD: 0 K/UL (ref 0–0.01)
NRBC BLD-RTO: 0 PER 100 WBC
P AERUGINOSA DNA BLD POS NAA+NON-PROBE: NOT DETECTED
PH UR STRIP: 5.5 (ref 5–8)
PLATELET # BLD AUTO: 305 K/UL (ref 150–400)
PMV BLD AUTO: 10.2 FL (ref 8.9–12.9)
POTASSIUM SERPL-SCNC: 4.7 MMOL/L (ref 3.5–5.1)
PROT SERPL-MCNC: 8.4 G/DL (ref 6.4–8.2)
PROT UR STRIP-MCNC: ABNORMAL MG/DL
PROTEUS SP DNA BLD POS QL NAA+NON-PROBE: NOT DETECTED
RBC # BLD AUTO: 4.88 M/UL (ref 3.8–5.2)
RBC #/AREA URNS HPF: ABNORMAL /HPF (ref 0–5)
RBC MORPH BLD: ABNORMAL
RESISTANT GENE TARGETS: ABNORMAL
S AUREUS DNA BLD POS QL NAA+NON-PROBE: NOT DETECTED
S AUREUS+CONS DNA BLD POS NAA+NON-PROBE: DETECTED
S EPIDERMIDIS DNA BLD POS QL NAA+NON-PRB: NOT DETECTED
S LUGDUNENSIS DNA BLD POS QL NAA+NON-PRB: NOT DETECTED
S MALTOPHILIA DNA BLD POS QL NAA+NON-PRB: NOT DETECTED
S MARCESCENS DNA BLD POS NAA+NON-PROBE: NOT DETECTED
S PNEUM DNA BLD POS QL NAA+NON-PROBE: NOT DETECTED
S PYO DNA BLD POS QL NAA+NON-PROBE: NOT DETECTED
SALMONELLA DNA BLD POS QL NAA+NON-PROBE: NOT DETECTED
SAMPLE TYPE: NORMAL
SERVICE CMNT-IMP: NORMAL
SODIUM SERPL-SCNC: 138 MMOL/L (ref 136–145)
SP GR UR REFRACTOMETRY: 1.03
SPECIMEN HOLD: NORMAL
SPECIMEN HOLD: NORMAL
SPECIMEN SOURCE: NORMAL
STREPTOCOCCUS DNA BLD POS NAA+NON-PROBE: NOT DETECTED
UROBILINOGEN UR QL STRIP.AUTO: 0.2 EU/DL (ref 0.2–1)
WBC # BLD AUTO: 10.9 K/UL (ref 3.6–11)
WBC URNS QL MICRO: ABNORMAL /HPF (ref 0–4)

## 2023-06-16 PROCEDURE — 87491 CHLMYD TRACH DNA AMP PROBE: CPT

## 2023-06-16 PROCEDURE — 2580000003 HC RX 258: Performed by: STUDENT IN AN ORGANIZED HEALTH CARE EDUCATION/TRAINING PROGRAM

## 2023-06-16 PROCEDURE — 96374 THER/PROPH/DIAG INJ IV PUSH: CPT

## 2023-06-16 PROCEDURE — 80053 COMPREHEN METABOLIC PANEL: CPT

## 2023-06-16 PROCEDURE — 87150 DNA/RNA AMPLIFIED PROBE: CPT

## 2023-06-16 PROCEDURE — 99285 EMERGENCY DEPT VISIT HI MDM: CPT

## 2023-06-16 PROCEDURE — 84703 CHORIONIC GONADOTROPIN ASSAY: CPT

## 2023-06-16 PROCEDURE — 36415 COLL VENOUS BLD VENIPUNCTURE: CPT

## 2023-06-16 PROCEDURE — 87591 N.GONORRHOEAE DNA AMP PROB: CPT

## 2023-06-16 PROCEDURE — 85025 COMPLETE CBC W/AUTO DIFF WBC: CPT

## 2023-06-16 PROCEDURE — 83605 ASSAY OF LACTIC ACID: CPT

## 2023-06-16 PROCEDURE — 81001 URINALYSIS AUTO W/SCOPE: CPT

## 2023-06-16 PROCEDURE — 6360000002 HC RX W HCPCS: Performed by: STUDENT IN AN ORGANIZED HEALTH CARE EDUCATION/TRAINING PROGRAM

## 2023-06-16 PROCEDURE — 96361 HYDRATE IV INFUSION ADD-ON: CPT

## 2023-06-16 PROCEDURE — 83690 ASSAY OF LIPASE: CPT

## 2023-06-16 PROCEDURE — 74177 CT ABD & PELVIS W/CONTRAST: CPT

## 2023-06-16 PROCEDURE — 96375 TX/PRO/DX INJ NEW DRUG ADDON: CPT

## 2023-06-16 PROCEDURE — 87040 BLOOD CULTURE FOR BACTERIA: CPT

## 2023-06-16 PROCEDURE — 6360000004 HC RX CONTRAST MEDICATION: Performed by: RADIOLOGY

## 2023-06-16 RX ORDER — MORPHINE SULFATE 4 MG/ML
4 INJECTION, SOLUTION INTRAMUSCULAR; INTRAVENOUS ONCE
Status: COMPLETED | OUTPATIENT
Start: 2023-06-16 | End: 2023-06-16

## 2023-06-16 RX ORDER — ONDANSETRON 4 MG/1
4 TABLET, ORALLY DISINTEGRATING ORAL EVERY 8 HOURS PRN
Qty: 21 TABLET | Refills: 0 | Status: SHIPPED | OUTPATIENT
Start: 2023-06-16

## 2023-06-16 RX ORDER — 0.9 % SODIUM CHLORIDE 0.9 %
1000 INTRAVENOUS SOLUTION INTRAVENOUS ONCE
Status: COMPLETED | OUTPATIENT
Start: 2023-06-16 | End: 2023-06-16

## 2023-06-16 RX ORDER — METRONIDAZOLE 500 MG/1
500 TABLET ORAL 2 TIMES DAILY
Qty: 14 TABLET | Refills: 0 | Status: SHIPPED | OUTPATIENT
Start: 2023-06-16 | End: 2023-06-23

## 2023-06-16 RX ORDER — CIPROFLOXACIN 500 MG/1
500 TABLET, FILM COATED ORAL 2 TIMES DAILY
Qty: 14 TABLET | Refills: 0 | Status: SHIPPED | OUTPATIENT
Start: 2023-06-16 | End: 2023-06-23

## 2023-06-16 RX ORDER — ONDANSETRON 2 MG/ML
4 INJECTION INTRAMUSCULAR; INTRAVENOUS ONCE
Status: COMPLETED | OUTPATIENT
Start: 2023-06-16 | End: 2023-06-16

## 2023-06-16 RX ADMIN — ONDANSETRON HYDROCHLORIDE 4 MG: 2 INJECTION, SOLUTION INTRAMUSCULAR; INTRAVENOUS at 02:35

## 2023-06-16 RX ADMIN — IOPAMIDOL 100 ML: 755 INJECTION, SOLUTION INTRAVENOUS at 03:05

## 2023-06-16 RX ADMIN — MORPHINE SULFATE 4 MG: 4 INJECTION INTRAVENOUS at 02:35

## 2023-06-16 RX ADMIN — SODIUM CHLORIDE 1000 ML: 9 INJECTION, SOLUTION INTRAVENOUS at 02:34

## 2023-06-16 ASSESSMENT — PAIN DESCRIPTION - LOCATION
LOCATION: ABDOMEN
LOCATION: ABDOMEN

## 2023-06-16 ASSESSMENT — PAIN - FUNCTIONAL ASSESSMENT: PAIN_FUNCTIONAL_ASSESSMENT: 0-10

## 2023-06-16 ASSESSMENT — PAIN SCALES - GENERAL
PAINLEVEL_OUTOF10: 4
PAINLEVEL_OUTOF10: 6
PAINLEVEL_OUTOF10: 4

## 2023-06-16 NOTE — ED PROVIDER NOTES
HPI   Patient is a 28 y.o. female who presents today with complaints of right lower quadrant and left lower quadrant abdominal pain for the last 1 days. No aggravating or alleviating factors. Pain is persistent but waxes and wanes in intensity. Reports associated nausea, vomiting, and flank pain. Denies diarrhea, constipation, or vaginal discharge. Csection. LMP ended last Tuesday. Is taking birth control, OCP. [place] ALLERGIES: Patient has no known allergies. Past Medical History:   Diagnosis Date    Abnormal Papanicolaou smear of cervix 03/2019    Colpo done and was WNL    Psychiatric problem     Anxiety and depression, was taking Welbutrin and Certaline, stopped when she found out she was pregnant     Past Surgical History:   Procedure Laterality Date    OTHER SURGICAL HISTORY      wisdom teeth       Review of Systems   Constitutional:  Negative for fever. HENT:  Negative for congestion. Eyes:  Negative for discharge. Respiratory:  Negative for shortness of breath. Cardiovascular:  Negative for chest pain. Gastrointestinal:  Positive for abdominal pain, nausea and vomiting. Genitourinary:  Positive for flank pain. Negative for dysuria. Skin:  Negative for wound. Neurological:  Negative for seizures. Psychiatric/Behavioral:  Negative for suicidal ideas. Vitals:    06/16/23 0015 06/16/23 0348   BP:  102/66   Pulse: (!) 112 87   Resp: 18 18   Temp: 98.8 °F (37.1 °C) 99.2 °F (37.3 °C)   TempSrc: Oral Oral   SpO2: 96% 99%            Physical Exam  Vitals and nursing note reviewed. Constitutional:       General: She is not in acute distress. Appearance: Normal appearance. She is not ill-appearing or toxic-appearing. HENT:      Head: Normocephalic. Nose: Nose normal.   Eyes:      Conjunctiva/sclera: Conjunctivae normal.   Pulmonary:      Effort: No respiratory distress. Abdominal:      General: Abdomen is flat. Bowel sounds are normal.      Palpations: Abdomen is soft.

## 2023-06-16 NOTE — ED TRIAGE NOTES
Patient arrived ambulatory w/ c/o abdominal pain, nausea, vomiting since 1300. Patient c/o pain across all quadrants. Last menstrual cycle last Tuesday (6/6). Patient tested self for UTI and COVID both negative.      Pain 4/10 (intermittent)

## 2023-06-17 LAB
BACTERIA SPEC CULT: ABNORMAL
BACTERIA SPEC CULT: ABNORMAL
SERVICE CMNT-IMP: ABNORMAL

## 2023-06-17 ASSESSMENT — ENCOUNTER SYMPTOMS
VOMITING: 1
SHORTNESS OF BREATH: 0
EYE DISCHARGE: 0
NAUSEA: 1
ABDOMINAL PAIN: 1

## 2023-06-17 NOTE — PROGRESS NOTES
Called to follow up with patient. (+) GPC per lab on Blood culture x1. No elevated WBC/ Neutrophil shift on lab results. Suspect likely contaminant. Discussed findings with Dr. Mickey Omer, who agreed with follow up via phone. Left VM on patients listed phone number requesting call back for follow up to speak with EM provider and review any infectious symptoms/ return precautions.      RICKEY Sawyer - NP

## 2023-06-17 NOTE — PROGRESS NOTES
Spoke to patient with identifiers used prior;  approximately 8:45 PM on 6/16/23 for follow up. Denies any new F/C, body aches, worsening infectious symptoms. States that her symptoms are improving. Discussed blood culture findings, possible contaminant sample. Reviewed strict return precautions. Patient stated understanding.      RICKEY Hoffman - NP

## 2023-06-21 LAB
BACTERIA SPEC CULT: NORMAL
SERVICE CMNT-IMP: NORMAL

## 2023-09-01 ENCOUNTER — APPOINTMENT (OUTPATIENT)
Facility: HOSPITAL | Age: 33
End: 2023-09-01
Payer: COMMERCIAL

## 2023-09-01 ENCOUNTER — HOSPITAL ENCOUNTER (EMERGENCY)
Facility: HOSPITAL | Age: 33
Discharge: HOME | End: 2023-09-01
Attending: STUDENT IN AN ORGANIZED HEALTH CARE EDUCATION/TRAINING PROGRAM
Payer: COMMERCIAL

## 2023-09-01 VITALS
HEART RATE: 100 BPM | OXYGEN SATURATION: 100 % | BODY MASS INDEX: 27.42 KG/M2 | DIASTOLIC BLOOD PRESSURE: 78 MMHG | SYSTOLIC BLOOD PRESSURE: 145 MMHG | TEMPERATURE: 98.6 F | WEIGHT: 149.91 LBS

## 2023-09-01 DIAGNOSIS — S61.311A LACERATION OF LEFT INDEX FINGER WITHOUT FOREIGN BODY WITH DAMAGE TO NAIL, INITIAL ENCOUNTER: Primary | ICD-10-CM

## 2023-09-01 PROCEDURE — 6370000000 HC RX 637 (ALT 250 FOR IP): Performed by: NURSE PRACTITIONER

## 2023-09-01 PROCEDURE — 2500000003 HC RX 250 WO HCPCS: Performed by: NURSE PRACTITIONER

## 2023-09-01 PROCEDURE — 12001 RPR S/N/AX/GEN/TRNK 2.5CM/<: CPT

## 2023-09-01 PROCEDURE — 73140 X-RAY EXAM OF FINGER(S): CPT

## 2023-09-01 PROCEDURE — 99283 EMERGENCY DEPT VISIT LOW MDM: CPT

## 2023-09-01 RX ORDER — GINSENG 100 MG
CAPSULE ORAL
Status: COMPLETED | OUTPATIENT
Start: 2023-09-01 | End: 2023-09-01

## 2023-09-01 RX ORDER — HYDROCODONE BITARTRATE AND ACETAMINOPHEN 5; 325 MG/1; MG/1
1 TABLET ORAL
Status: COMPLETED | OUTPATIENT
Start: 2023-09-01 | End: 2023-09-01

## 2023-09-01 RX ORDER — LIDOCAINE HYDROCHLORIDE 10 MG/ML
5 INJECTION, SOLUTION EPIDURAL; INFILTRATION; INTRACAUDAL; PERINEURAL
Status: COMPLETED | OUTPATIENT
Start: 2023-09-01 | End: 2023-09-01

## 2023-09-01 RX ADMIN — HYDROCODONE BITARTRATE AND ACETAMINOPHEN 1 TABLET: 5; 325 TABLET ORAL at 20:46

## 2023-09-01 RX ADMIN — LIDOCAINE HYDROCHLORIDE 5 ML: 10 INJECTION, SOLUTION EPIDURAL; INFILTRATION; INTRACAUDAL; PERINEURAL at 20:45

## 2023-09-01 RX ADMIN — BACITRACIN 1 EACH: 500 OINTMENT TOPICAL at 20:46

## 2023-09-01 ASSESSMENT — ENCOUNTER SYMPTOMS
ABDOMINAL PAIN: 0
EYE DISCHARGE: 0
SHORTNESS OF BREATH: 0

## 2023-09-01 ASSESSMENT — PAIN SCALES - GENERAL: PAINLEVEL_OUTOF10: 4

## 2023-09-01 ASSESSMENT — PAIN DESCRIPTION - LOCATION: LOCATION: FINGER (COMMENT WHICH ONE)

## 2023-09-01 NOTE — ED TRIAGE NOTES
Triage: Pt arrives ambulatory from home with CC of lacerating her left index finger when cutting oregano. Last tetanus was 4 years ago.

## 2023-09-01 NOTE — ED PROVIDER NOTES
[facility] [place] [facility]  EMERGENCY DEPARTMENT ENCOUNTER      Pt Name: Jerry Santos  MRN: [id number]  [address] [facility] 1990  Date of evaluation: 9/1/2023  Provider: RICKEY Calle   Patient is a right handed 28 y.o. female with no chronic conditions   who presents today with complaints of laceration to left index finger. Cut self with kitchen knife. Tetanus last 4 years ago. States she does not have any rings on her hand. There are no other complaints, changes or physical findings at this time. PAST MEDICAL HISTORY     Past Medical History:   Diagnosis Date    Abnormal Papanicolaou smear of cervix 03/2019    Colpo done and was WNL    Psychiatric problem     Anxiety and depression, was taking Welbutrin and Certaline, stopped when she found out she was pregnant         SURGICAL HISTORY       Past Surgical History:   Procedure Laterality Date    OTHER SURGICAL HISTORY      wisdom teeth         CURRENT MEDICATIONS       Discharge Medication List as of 9/1/2023  9:12 PM        CONTINUE these medications which have NOT CHANGED    Details   ondansetron (ZOFRAN-ODT) 4 MG disintegrating tablet Take 1 tablet by mouth every 8 hours as needed for Nausea or Vomiting, Disp-21 tablet, R-0Print             ALLERGIES     Patient has no known allergies. FAMILY HISTORY       Family History   Problem Relation Age of Onset    Cancer Paternal Grandmother         lung    Glaucoma Maternal Grandmother     Stroke Maternal Grandmother     Cancer Father         lung Ca and Brain Ca    Diabetes Maternal Grandmother           SOCIAL HISTORY       Social History     Socioeconomic History    Marital status:    Tobacco Use    Smoking status: Never    Smokeless tobacco: Never   Substance and Sexual Activity    Alcohol use: Never    Drug use: Never           Review of Systems   Constitutional:  Negative for fever. HENT:  Negative for congestion. Eyes:  Negative for discharge.    Respiratory:  Negative for shortness of

## (undated) DEVICE — 3000CC GUARDIAN II: Brand: GUARDIAN

## (undated) DEVICE — ELECTROSURGICAL DEVICE HOLSTER;FOR USE WITH MAXIMUM PEAK VOLTAGE OF 4000 V: Brand: FORCE TRIVERSE

## (undated) DEVICE — (D)PREP SKN CHLRAPRP APPL 26ML -- CONVERT TO ITEM 371833

## (undated) DEVICE — ROYALSILK SURGICAL GOWN, L: Brand: CONVERTORS

## (undated) DEVICE — COVERALL PREM SMS 2XL KNIT --

## (undated) DEVICE — DEVON™ KNEE AND BODY STRAP 60" X 3" (1.5 M X 7.6 CM): Brand: DEVON

## (undated) DEVICE — DRESSING AQUACEL ADVANTAGE ALG W4XL5IN RECT GREATER ABSRB HYDRFBR TECHNOLOGY

## (undated) DEVICE — STERILE POLYISOPRENE POWDER-FREE SURGICAL GLOVES WITH EMOLLIENT COATING: Brand: PROTEXIS

## (undated) DEVICE — 1200 GUARD II KIT W/5MM TUBE W/O VAC TUBE: Brand: GUARDIAN

## (undated) DEVICE — PACK PROCEDURE SURG C SECT KT SMH

## (undated) DEVICE — LIGHT HANDLE: Brand: DEVON

## (undated) DEVICE — STERILE POLYISOPRENE POWDER-FREE SURGICAL GLOVES: Brand: PROTEXIS

## (undated) DEVICE — REM POLYHESIVE ADULT PATIENT RETURN ELECTRODE: Brand: VALLEYLAB

## (undated) DEVICE — SOLIDIFIER MEDC 1200ML -- CONVERT TO 356117

## (undated) DEVICE — KENDALL SCD EXPRESS SLEEVES, KNEE LENGTH, MEDIUM: Brand: KENDALL SCD

## (undated) DEVICE — SOLUTION IRRIG 1000ML H2O STRL BLT

## (undated) DEVICE — MASTISOL ADHESIVE LIQ 2/3ML

## (undated) DEVICE — SUTURE VCRL SZ 0 L36IN ABSRB VLT L40MM CT 1/2 CIR J358H

## (undated) DEVICE — TIP CLEANER: Brand: VALLEYLAB

## (undated) DEVICE — SUTURE MCRYL SZ 3-0 L27IN ABSRB UD L60MM KS STR REV CUT Y523H

## (undated) DEVICE — DRAPE FLD WRM W44XL66IN C6L FOR INTRATEMP SYS THERMABASIN

## (undated) DEVICE — SUTURE VCRL SZ 2-0 L36IN ABSRB VLT L36MM CT-1 1/2 CIR J345H

## (undated) DEVICE — SUTURE MCRYL SZ 0 L36IN ABSRB UD L36MM CT-1 1/2 CIR Y946H

## (undated) DEVICE — SOLUTION IV 1000ML 0.9% SOD CHL